# Patient Record
Sex: MALE | Race: WHITE | NOT HISPANIC OR LATINO | Employment: OTHER | URBAN - METROPOLITAN AREA
[De-identification: names, ages, dates, MRNs, and addresses within clinical notes are randomized per-mention and may not be internally consistent; named-entity substitution may affect disease eponyms.]

---

## 2017-01-09 ENCOUNTER — LAB VISIT (OUTPATIENT)
Dept: LAB | Facility: HOSPITAL | Age: 62
End: 2017-01-09
Attending: FAMILY MEDICINE
Payer: COMMERCIAL

## 2017-01-09 DIAGNOSIS — E11.69 DYSLIPIDEMIA ASSOCIATED WITH TYPE 2 DIABETES MELLITUS: ICD-10-CM

## 2017-01-09 DIAGNOSIS — E11.22 HYPERTENSION ASSOCIATED WITH CHRONIC KIDNEY DISEASE DUE TO TYPE 2 DIABETES MELLITUS: ICD-10-CM

## 2017-01-09 DIAGNOSIS — I12.9 HYPERTENSION ASSOCIATED WITH CHRONIC KIDNEY DISEASE DUE TO TYPE 2 DIABETES MELLITUS: ICD-10-CM

## 2017-01-09 DIAGNOSIS — E78.5 DYSLIPIDEMIA ASSOCIATED WITH TYPE 2 DIABETES MELLITUS: ICD-10-CM

## 2017-01-09 LAB
ALBUMIN SERPL BCP-MCNC: 3.9 G/DL
ALP SERPL-CCNC: 56 U/L
ALT SERPL W/O P-5'-P-CCNC: 27 U/L
ANION GAP SERPL CALC-SCNC: 5 MMOL/L
AST SERPL-CCNC: 21 U/L
BILIRUB SERPL-MCNC: 0.4 MG/DL
BUN SERPL-MCNC: 19 MG/DL
CALCIUM SERPL-MCNC: 9.2 MG/DL
CHLORIDE SERPL-SCNC: 104 MMOL/L
CHOLEST/HDLC SERPL: 6.6 {RATIO}
CO2 SERPL-SCNC: 28 MMOL/L
CREAT SERPL-MCNC: 1.2 MG/DL
EST. GFR  (AFRICAN AMERICAN): >60 ML/MIN/1.73 M^2
EST. GFR  (NON AFRICAN AMERICAN): >60 ML/MIN/1.73 M^2
GLUCOSE SERPL-MCNC: 105 MG/DL
HDL/CHOLESTEROL RATIO: 15.1 %
HDLC SERPL-MCNC: 265 MG/DL
HDLC SERPL-MCNC: 40 MG/DL
LDLC SERPL CALC-MCNC: 202.4 MG/DL
NONHDLC SERPL-MCNC: 225 MG/DL
POTASSIUM SERPL-SCNC: 4.8 MMOL/L
PROT SERPL-MCNC: 6.8 G/DL
SODIUM SERPL-SCNC: 137 MMOL/L
TRIGL SERPL-MCNC: 113 MG/DL

## 2017-01-09 PROCEDURE — 80053 COMPREHEN METABOLIC PANEL: CPT

## 2017-01-09 PROCEDURE — 83036 HEMOGLOBIN GLYCOSYLATED A1C: CPT

## 2017-01-09 PROCEDURE — 36415 COLL VENOUS BLD VENIPUNCTURE: CPT | Mod: PO

## 2017-01-09 PROCEDURE — 80061 LIPID PANEL: CPT

## 2017-01-10 LAB
ESTIMATED AVG GLUCOSE: 137 MG/DL
HBA1C MFR BLD HPLC: 6.4 %

## 2017-01-30 ENCOUNTER — OFFICE VISIT (OUTPATIENT)
Dept: INTERNAL MEDICINE | Facility: CLINIC | Age: 62
End: 2017-01-30
Payer: COMMERCIAL

## 2017-01-30 VITALS
BODY MASS INDEX: 41.06 KG/M2 | SYSTOLIC BLOOD PRESSURE: 120 MMHG | TEMPERATURE: 98 F | WEIGHT: 303.13 LBS | HEART RATE: 66 BPM | HEIGHT: 72 IN | DIASTOLIC BLOOD PRESSURE: 88 MMHG

## 2017-01-30 DIAGNOSIS — E78.5 DYSLIPIDEMIA ASSOCIATED WITH TYPE 2 DIABETES MELLITUS: ICD-10-CM

## 2017-01-30 DIAGNOSIS — E11.22 HYPERTENSION ASSOCIATED WITH CHRONIC KIDNEY DISEASE DUE TO TYPE 2 DIABETES MELLITUS: ICD-10-CM

## 2017-01-30 DIAGNOSIS — E66.01 MORBID OBESITY DUE TO EXCESS CALORIES: ICD-10-CM

## 2017-01-30 DIAGNOSIS — E11.69 DYSLIPIDEMIA ASSOCIATED WITH TYPE 2 DIABETES MELLITUS: ICD-10-CM

## 2017-01-30 DIAGNOSIS — I12.9 HYPERTENSION ASSOCIATED WITH CHRONIC KIDNEY DISEASE DUE TO TYPE 2 DIABETES MELLITUS: ICD-10-CM

## 2017-01-30 PROCEDURE — 3044F HG A1C LEVEL LT 7.0%: CPT | Mod: S$GLB,,, | Performed by: FAMILY MEDICINE

## 2017-01-30 PROCEDURE — 99999 PR PBB SHADOW E&M-EST. PATIENT-LVL III: CPT | Mod: PBBFAC,,, | Performed by: FAMILY MEDICINE

## 2017-01-30 PROCEDURE — 2022F DILAT RTA XM EVC RTNOPTHY: CPT | Mod: S$GLB,,, | Performed by: FAMILY MEDICINE

## 2017-01-30 PROCEDURE — 1159F MED LIST DOCD IN RCRD: CPT | Mod: S$GLB,,, | Performed by: FAMILY MEDICINE

## 2017-01-30 PROCEDURE — 4010F ACE/ARB THERAPY RXD/TAKEN: CPT | Mod: S$GLB,,, | Performed by: FAMILY MEDICINE

## 2017-01-30 PROCEDURE — 99214 OFFICE O/P EST MOD 30 MIN: CPT | Mod: S$GLB,,, | Performed by: FAMILY MEDICINE

## 2017-01-30 NOTE — MR AVS SNAPSHOT
[facility]  [address] [place] [address]  Phone: [phone]  Fax: [phone]                  Aniket Brown   2017 11:00 AM   Office Visit    Description:  Male : 1955   Provider:  Garry Chandler MD   [facility]           Reason for Visit     Follow-up           Diagnoses this Visit        Comments    Uncontrolled type 2 diabetes mellitus with microalbuminuria, without long-term current use of insulin    -  Primary     Dyslipidemia associated with type 2 diabetes mellitus         Hypertension associated with chronic kidney disease due to type 2 diabetes mellitus                To Do List           Future Appointments        Provider Department Dept Phone    3/27/2017 10:00 AM LABORATORY, [address] [facility] [facility] [phone]    3/29/2017 1:40 PM MD [facility]  Urology [phone]    2017 7:50 AM LABORATORY, [facility] [phone]    2017 11:00 AM Garry Chandler MD [facility] [phone]      Goals (5 Years of Data)     None      Follow-Up and Disposition     Return in about 6 months (around 2017).      [facility]     [facility] -  Assistance  Registered nurses in the [facility] provide clinical advisement, health education, appointment booking, and other advisory services.  Call for this free service at [phone].             Medications           Message regarding Medications     Verify the changes and/or additions to your medication regime listed below are the same as discussed with your clinician today.  If any of these changes or additions are incorrect, please notify your healthcare provider.             Verify that the below list of medications is an accurate representation of the medications you are currently taking.  If none reported, the list may be blank. If  "incorrect, please contact your healthcare provider. Carry this list with you in case of emergency.           Current Medications     glimepiride (AMARYL) 2 MG tablet Take 1 tablet (2 mg total) by mouth before breakfast.    losartan (COZAAR) 25 MG tablet Take 1 tablet (25 mg total) by mouth once daily.    meloxicam (MOBIC) 7.5 MG tablet TAKE 1 TABLET DAILY EVERY MORNING AS NEEDED FOR PAIN    pravastatin (PRAVACHOL) 20 MG tablet Take 1 tablet (20 mg total) by mouth once daily.    testosterone (ANDROGEL) 20.25 mg/1.25 gram (1.62 %) GlPm 4 Squirts by Percutaneous route once daily. 4 Gel in Metered-Dose Pump Transdermal Every day    triamcinolone acetonide 0.1% (KENALOG) 0.1 % ointment Apply topically 2 (two) times daily.           Clinical Reference Information           Vital Signs - Last Recorded  Most recent update: 1/30/2017 11:21 AM by Nia Fregoso MA    BP Pulse Temp Ht Wt BMI    120/88 66 98.3 °F (36.8 °C) (Oral) 5' 11.75" (1.822 m) (!) 137.5 kg (303 lb 2.1 oz) 41.4 kg/m2      Blood Pressure          Most Recent Value    BP  120/88      Allergies as of 1/30/2017     Crestor  [Rosuvastatin]    Lipitor  [Atorvastatin]    Metformin      Immunizations Administered on Date of Encounter - 1/30/2017     None      Orders Placed During Today's Visit     Future Labs/Procedures Expected by Expires    Comprehensive metabolic panel  7/30/2017 10/30/2017    Hemoglobin A1c  7/30/2017 10/30/2017    Lipid panel  7/30/2017 10/30/2017      [facility] Sign-Up     Activating your [facility] account is as easy as 1-2-3!     1) Visit [email or web address], select Sign Up Now, enter this activation code and your date of birth, then select Next.  [email or web address]  Expires: 3/16/2017 11:54 AM      2) Create a username and password to use when you visit [facility] in the future and select a security question in case you lose your password and select Next.    3) Enter your e-mail address and click Sign Up!    Additional Information  If you " have questions, please e-mail [email or web address] or call [phone] to talk to our [facility] staff. Remember, [facility] is NOT to be used for urgent needs. For medical emergencies, dial 911.

## 2017-01-31 NOTE — PROGRESS NOTES
"Subjective:      Patient ID: Aniket Brown is a 61 y.o. male.    Chief Complaint:  F/U BP/DM/Lipids/weight    HPI  62 yo male here for f/u on chronic conditions.  Not been taking statin.  Taking BP and DM med.  Has had bad SE to statin so leary to take it.  Feeling good overall.  Weight is same, planning to focus on some changes to work on weight loss.  Denies CP/SOB.  Bowels moving well.    Past Medical History   Diagnosis Date    Diabetes 1.5, managed as type 2     Hyperlipidemia      Family History   Problem Relation Age of Onset    Early death Father     Diabetes Brother     Drug abuse Brother     Alcohol abuse Brother      Past Surgical History   Procedure Laterality Date    Appendectomy      Colonoscopy N/A 11/17/2016     Procedure: COLONOSCOPY;  Surgeon: Miguel Lance MD;  Location: [facility];  Service: Endoscopy;  Laterality: N/A;     Social History   Substance Use Topics    Smoking status: Never Smoker    Smokeless tobacco: Never Used    Alcohol use 0.0 oz/week     0 Standard drinks or equivalent per week      Comment: socially       Visit Vitals    /88    Pulse 66    Temp 98.3 °F (36.8 °C) (Oral)    Ht 5' 11.75" (1.822 m)    Wt (!) 137.5 kg (303 lb 2.1 oz)    BMI 41.4 kg/m2       Review of Systems   Constitutional: Negative for activity change, appetite change, chills, diaphoresis, fatigue, fever and unexpected weight change.   HENT: Negative for hearing loss and tinnitus.    Eyes: Negative for visual disturbance.   Respiratory: Negative for cough, chest tightness, shortness of breath and wheezing.    Cardiovascular: Negative for chest pain, palpitations and leg swelling.   Gastrointestinal: Negative for abdominal distention and abdominal pain.   Genitourinary: Negative for difficulty urinating.   Musculoskeletal: Negative for arthralgias and back pain.   Skin: Negative.    Neurological: Negative for dizziness, weakness and headaches.     Objective:     Physical Exam "   Constitutional: He is oriented to person, place, and time. He appears well-developed and well-nourished. No distress.   HENT:   Right Ear: External ear normal.   Left Ear: External ear normal.   Nose: Nose normal.   Mouth/Throat: Oropharynx is clear and moist.   Eyes: Pupils are equal, round, and reactive to light.   Neck: Normal range of motion. Neck supple. No thyromegaly present.   Cardiovascular: Normal rate, regular rhythm and normal heart sounds.    No murmur heard.  Pulmonary/Chest: Effort normal and breath sounds normal. No respiratory distress. He has no wheezes.   Abdominal: Soft. Bowel sounds are normal. He exhibits no distension.   Musculoskeletal: He exhibits no edema.   Lymphadenopathy:     He has no cervical adenopathy.   Neurological: He is alert and oriented to person, place, and time. No cranial nerve deficit.   Skin: Skin is warm and dry. He is not diaphoretic.   Nursing note and vitals reviewed.      Lab Results   Component Value Date    WBC 6.93 09/26/2016    HGB 15.8 09/26/2016    HCT 46.0 09/26/2016     09/26/2016    CHOL 265 (H) 01/09/2017    TRIG 113 01/09/2017    HDL 40 01/09/2017    ALT 27 01/09/2017    AST 21 01/09/2017     01/09/2017    K 4.8 01/09/2017     01/09/2017    CREATININE 1.2 01/09/2017    BUN 19 01/09/2017    CO2 28 01/09/2017    TSH 2.898 09/26/2016    PSA 2.2 03/07/2016    HGBA1C 6.4 (H) 01/09/2017       Assessment:     1. Uncontrolled type 2 diabetes mellitus with microalbuminuria, without long-term current use of insulin    2. Dyslipidemia associated with type 2 diabetes mellitus    3. Hypertension associated with chronic kidney disease due to type 2 diabetes mellitus    4. Morbid obesity due to excess calories       Plan:   Uncontrolled type 2 diabetes mellitus with microalbuminuria, without long-term current use of insulin  -     Comprehensive metabolic panel; Future; Expected date: 7/30/17  -     Hemoglobin A1c; Future; Expected date:  7/30/17    Dyslipidemia associated with type 2 diabetes mellitus  -     Lipid panel; Future; Expected date: 7/30/17    Hypertension associated with chronic kidney disease due to type 2 diabetes mellitus    Morbid obesity due to excess calories    BP well controlled, cont ARB  DM controlled cont current med  Lipids uncontrolled.  Try the statin daily.  Work on good diet/exercise to achieve weight loss.  Declines pneumonia vaccine  F/u 6 mos with labs

## 2017-03-27 ENCOUNTER — LAB VISIT (OUTPATIENT)
Dept: LAB | Facility: HOSPITAL | Age: 62
End: 2017-03-27
Attending: UROLOGY
Payer: COMMERCIAL

## 2017-03-27 DIAGNOSIS — E23.0 HYPOGONADOTROPIC HYPOGONADISM: Chronic | ICD-10-CM

## 2017-03-27 LAB
ALBUMIN SERPL BCP-MCNC: 3.7 G/DL
ALP SERPL-CCNC: 52 U/L
ALT SERPL W/O P-5'-P-CCNC: 28 U/L
AST SERPL-CCNC: 20 U/L
BILIRUB DIRECT SERPL-MCNC: 0.1 MG/DL
BILIRUB SERPL-MCNC: 0.3 MG/DL
HCT VFR BLD AUTO: 45.3 %
PROT SERPL-MCNC: 6.7 G/DL

## 2017-03-27 PROCEDURE — 85014 HEMATOCRIT: CPT

## 2017-03-27 PROCEDURE — 36415 COLL VENOUS BLD VENIPUNCTURE: CPT | Mod: PO

## 2017-03-27 PROCEDURE — 80076 HEPATIC FUNCTION PANEL: CPT

## 2017-03-29 ENCOUNTER — OFFICE VISIT (OUTPATIENT)
Dept: UROLOGY | Facility: CLINIC | Age: 62
End: 2017-03-29
Payer: COMMERCIAL

## 2017-03-29 VITALS
HEART RATE: 68 BPM | WEIGHT: 302 LBS | BODY MASS INDEX: 41.25 KG/M2 | DIASTOLIC BLOOD PRESSURE: 92 MMHG | SYSTOLIC BLOOD PRESSURE: 140 MMHG

## 2017-03-29 DIAGNOSIS — E23.0 HYPOGONADOTROPIC HYPOGONADISM: Chronic | ICD-10-CM

## 2017-03-29 DIAGNOSIS — E29.1 HYPOGONADISM IN MALE: Primary | ICD-10-CM

## 2017-03-29 PROCEDURE — 99214 OFFICE O/P EST MOD 30 MIN: CPT | Mod: S$GLB,,, | Performed by: UROLOGY

## 2017-03-29 PROCEDURE — 1160F RVW MEDS BY RX/DR IN RCRD: CPT | Mod: S$GLB,,, | Performed by: UROLOGY

## 2017-03-29 PROCEDURE — 99999 PR PBB SHADOW E&M-EST. PATIENT-LVL II: CPT | Mod: PBBFAC,,, | Performed by: UROLOGY

## 2017-03-29 RX ORDER — TESTOSTERONE 20.25 MG/1.25G
4 GEL TOPICAL DAILY
Qty: 6 BOTTLE | Refills: 5 | Status: SHIPPED | OUTPATIENT
Start: 2017-03-29 | End: 2017-10-09 | Stop reason: SDUPTHER

## 2017-03-29 NOTE — MR AVS SNAPSHOT
[facility]  [address]  [place] [address]  Phone: [phone]  Fax: [phone]                  Aniket Brown   3/29/2017 1:40 PM   Office Visit    Description:  Male : 1955   Provider:  Fran Moise IV, MD   Department:  [facility]           Diagnoses this Visit        Comments    Hypogonadism in male    -  Primary     Hypogonadotropic hypogonadism                To Do List           Future Appointments        Provider Department Dept Phone    2017 7:50 AM LAB[facility]ATORY, [facility] [phone]    2017 11:00 AM Garry Chandler MD [place] [phone]    2017 10:00 AM LABORATORY, [facility] [phone]    10/9/2017 11:00 AM Fran Moise IV, MD [facility] [phone]      Goals (5 Years of Data)     None      Follow-Up and Disposition     Return in about 6 months (around 2017).    Follow-up and Disposition History       These Medications        Disp Refills Start End    testosterone (ANDROGEL) 20.25 mg/1.25 gram (1.62 %) GlPm 6 Bottle 5 3/29/2017     4 Squirts by Percutaneous route once daily. 4 Gel in Metered-Dose Pump Transdermal Every day - Percutaneous    Pharmacy: [facility] - [address] #: [phone]         [facility]     [facility] -  Assistance  Registered nurses in the [facility] provide clinical advisement, health education, appointment booking, and other advisory services.  Call for this free service at [phone].             Medications           Message regarding Medications     Verify the changes and/or additions to your medication regime listed below are the same as discussed with your clinician today.  If any of these changes or additions are incorrect, please notify your healthcare provider.        STOP taking these medications      triamcinolone acetonide 0.1% (KENALOG) 0.1 % ointment Apply topically 2 (two) times daily.           Verify that the below list of medications is an accurate representation of the medications you are currently taking.  If none reported, the list may be blank. If incorrect, please contact your healthcare provider. Carry this list with you in case of emergency.           Current Medications     glimepiride (AMARYL) 2 MG tablet Take 1 tablet (2 mg total) by mouth before breakfast.    losartan (COZAAR) 25 MG tablet Take 1 tablet (25 mg total) by mouth once daily.    meloxicam (MOBIC) 7.5 MG tablet TAKE 1 TABLET DAILY EVERY MORNING AS NEEDED FOR PAIN    pravastatin (PRAVACHOL) 20 MG tablet Take 1 tablet (20 mg total) by mouth once daily.    testosterone (ANDROGEL) 20.25 mg/1.25 gram (1.62 %) GlPm 4 Squirts by Percutaneous route once daily. 4 Gel in Metered-Dose Pump Transdermal Every day           Clinical Reference Information           Your Vitals Were     BP Pulse Weight BMI       140/92 (BP Location: Right arm, Patient Position: Sitting) 68 137 kg (302 lb 0.5 oz) 41.25 kg/m2       Blood Pressure          Most Recent Value    BP  (!)  140/92      Allergies as of 3/29/2017     Crestor  [Rosuvastatin]    Lipitor  [Atorvastatin]    Metformin      Immunizations Administered on Date of Encounter - 3/29/2017     None      Orders Placed During Today's Visit     Future Labs/Procedures Expected by Expires    ESTRADIOL  3/29/2017 5/28/2018    Hematocrit  3/29/2017 5/28/2018    Hepatic function panel  3/29/2017 5/28/2018    TESTOSTERONE PANEL  3/29/2017 5/28/2018    PSA, Screening  3/29/2018 5/28/2018      [facility] Sign-Up     Activating your [facility] account is as easy as 1-2-3!     1) Visit [email or web address], select Sign Up Now, enter this activation code and your date of birth, then select Next.  [email or web address]  Expires: 5/13/2017  2:09 PM      2) Create a username and password to use when you visit [facility] in the future  and select a security question in case you lose your password and select Next.    3) Enter your e-mail address and click Sign Up!    Additional Information  If you have questions, please e-mail [email or web address] or call [phone] to talk to our [facility] staff. Remember, [facility] is NOT to be used for urgent needs. For medical emergencies, dial 911.         Language Assistance Services     ATTENTION: Language assistance services are available, free of charge. Please call [phone].      ATENCIÓN: Si habla español, tiene a waller disposición servicios gratuitos de asistencia lingüística. Llame al [phone].     CHÚ Ý: N?u b?n nói Ti?ng Vi?t, có các d?ch v? h? tr? ngôn ng? mi?n phí dành cho b?n. G?i s? [phone].         [facility] complies with applicable Federal civil rights laws and does not discriminate on the basis of race, color, national origin, age, disability, or sex.

## 2017-03-29 NOTE — PROGRESS NOTES
Chief Complaint: Hypogonadism    HPI:   3/29/17: No problems feeling well.  Been off the T a month starting to feel it.  9/29/16: Doing well labs appropriate.  Misses the T since he has been off a few weeks.  3/28/16: No problems doing well.  9/25/15: No problems with T satisfied, labs approp.  3/27/15: 58 yo man 7 yrs ago was worked up and diagnosed for hypogonadism (low energy, nausea, etc) and was started on depot T then gel now lately Androgel (1.63%) daily.  Recent LFT/hematocrit normal.  No urolithiasis history, gross hematuria, family prostate cancer history.  No abd/pelvic pain and no exac/rel factors.  Has been off T since November and the symptoms are back fully.  T is low on lab recheck.  Has had microhematuria for many years with cysto twice and full workup negative.    Allergies:  Crestor  [rosuvastatin]; Lipitor  [atorvastatin]; and Metformin    Medications:  has a current medication list which includes the following prescription(s): glimepiride, losartan, meloxicam, pravastatin, and testosterone.    Review of Systems:  General: No fever, chills, fatigability, or weight loss.  Skin: No rashes, itching, or changes in color or texture of skin.  Chest: Denies LEVY, cyanosis, wheezing, cough, and sputum production.  Abdomen: Appetite fine. No weight loss. Denies diarrhea, abdominal pain, hematemesis, or blood in stool.  Musculoskeletal: No joint stiffness or swelling. Denies back pain.  : As above.  All other review of systems negative.    PMH:   has a past medical history of Diabetes 1.5, managed as type 2 and Hyperlipidemia.    PSH:   has a past surgical history that includes Appendectomy and Colonoscopy (N/A, 11/17/2016).    FamHx: family history includes Alcohol abuse in his brother; Diabetes in his brother; Drug abuse in his brother; Early death in his father.    SocHx:  reports that he has never smoked. He has never used smokeless tobacco. He reports that he drinks alcohol. He reports that he does  not use illicit drugs.      Physical Exam:  Vitals:    03/29/17 1400   BP: (!) 140/92   Pulse: 68     General: A&Ox3, no apparent distress, no deformities  Neck: No masses, normal thyroid  Lungs: normal inspiration, no use of accessory muscles  Heart: normal pulse, no arrhythmias  Abdomen: Soft, NT, ND  Skin: The skin is warm and dry. No jaundice.  Ext: No c/c/e.  :   3/28/16: Test desc willa, no abnormalities of epididymus. Penis normal, with normal penile and scrotal skin. Meatus normal. Normal rectal tone, no hemorrhoids. Prost 30 gm no nodules or masses appreciated. SV not palpable. Perineum and anus normal.    Labs/Studies:   PSA    3/15: 1.6    3/16: 2.2    Impression/Plan:   1. Will rx androgel and check labs q6 mo with a visit each interval; RTC with me if things change.

## 2017-04-17 DIAGNOSIS — R52 PAIN: ICD-10-CM

## 2017-04-17 RX ORDER — MELOXICAM 7.5 MG/1
TABLET ORAL
Qty: 90 TABLET | Refills: 2 | Status: SHIPPED | OUTPATIENT
Start: 2017-04-17 | End: 2018-01-15 | Stop reason: SDUPTHER

## 2017-05-12 ENCOUNTER — TELEPHONE (OUTPATIENT)
Dept: UROLOGY | Facility: CLINIC | Age: 62
End: 2017-05-12

## 2017-05-12 NOTE — TELEPHONE ENCOUNTER
Patient states he is going on a cruise soon and would like a prescription for motion sickness. He states you had done this for him one time before. Please advise.

## 2017-05-12 NOTE — TELEPHONE ENCOUNTER
----- Message from Diego Peters sent at 5/12/2017  8:27 AM CDT -----  Pt is requesting a prescription for motion sickness.              Please call pt back at [phone]          [facility]/pharmacy #[id number] - [place] - [address]  [address]  [place] [address]  Phone: [phone] Fax: [phone]

## 2017-05-15 RX ORDER — MECLIZINE HCL 12.5 MG 12.5 MG/1
12.5 TABLET ORAL 3 TIMES DAILY PRN
Qty: 30 TABLET | Refills: 1 | Status: SHIPPED | OUTPATIENT
Start: 2017-05-15 | End: 2017-07-31

## 2017-07-24 ENCOUNTER — LAB VISIT (OUTPATIENT)
Dept: LAB | Facility: HOSPITAL | Age: 62
End: 2017-07-24
Attending: FAMILY MEDICINE
Payer: COMMERCIAL

## 2017-07-24 DIAGNOSIS — E11.69 DYSLIPIDEMIA ASSOCIATED WITH TYPE 2 DIABETES MELLITUS: ICD-10-CM

## 2017-07-24 DIAGNOSIS — E78.5 DYSLIPIDEMIA ASSOCIATED WITH TYPE 2 DIABETES MELLITUS: ICD-10-CM

## 2017-07-24 LAB
ALBUMIN SERPL BCP-MCNC: 3.7 G/DL
ALP SERPL-CCNC: 50 U/L
ALT SERPL W/O P-5'-P-CCNC: 37 U/L
ANION GAP SERPL CALC-SCNC: 9 MMOL/L
AST SERPL-CCNC: 28 U/L
BILIRUB SERPL-MCNC: 0.5 MG/DL
BUN SERPL-MCNC: 19 MG/DL
CALCIUM SERPL-MCNC: 8.8 MG/DL
CHLORIDE SERPL-SCNC: 107 MMOL/L
CHOLEST/HDLC SERPL: 7.5 {RATIO}
CO2 SERPL-SCNC: 23 MMOL/L
CREAT SERPL-MCNC: 1.2 MG/DL
EST. GFR  (AFRICAN AMERICAN): >60 ML/MIN/1.73 M^2
EST. GFR  (NON AFRICAN AMERICAN): >60 ML/MIN/1.73 M^2
GLUCOSE SERPL-MCNC: 150 MG/DL
HDL/CHOLESTEROL RATIO: 13.3 %
HDLC SERPL-MCNC: 249 MG/DL
HDLC SERPL-MCNC: 33 MG/DL
LDLC SERPL CALC-MCNC: 172.6 MG/DL
NONHDLC SERPL-MCNC: 216 MG/DL
POTASSIUM SERPL-SCNC: 4.5 MMOL/L
PROT SERPL-MCNC: 6.4 G/DL
SODIUM SERPL-SCNC: 139 MMOL/L
TRIGL SERPL-MCNC: 217 MG/DL

## 2017-07-24 PROCEDURE — 80053 COMPREHEN METABOLIC PANEL: CPT

## 2017-07-24 PROCEDURE — 36415 COLL VENOUS BLD VENIPUNCTURE: CPT | Mod: PO

## 2017-07-24 PROCEDURE — 83036 HEMOGLOBIN GLYCOSYLATED A1C: CPT

## 2017-07-24 PROCEDURE — 80061 LIPID PANEL: CPT

## 2017-07-25 LAB
ESTIMATED AVG GLUCOSE: 157 MG/DL
HBA1C MFR BLD HPLC: 7.1 %

## 2017-07-31 ENCOUNTER — OFFICE VISIT (OUTPATIENT)
Dept: INTERNAL MEDICINE | Facility: CLINIC | Age: 62
End: 2017-07-31
Payer: COMMERCIAL

## 2017-07-31 VITALS
TEMPERATURE: 98 F | HEART RATE: 70 BPM | WEIGHT: 306.88 LBS | BODY MASS INDEX: 41.57 KG/M2 | HEIGHT: 72 IN | SYSTOLIC BLOOD PRESSURE: 122 MMHG | DIASTOLIC BLOOD PRESSURE: 86 MMHG

## 2017-07-31 DIAGNOSIS — E11.9 CONTROLLED TYPE 2 DIABETES MELLITUS WITHOUT COMPLICATION, WITHOUT LONG-TERM CURRENT USE OF INSULIN: ICD-10-CM

## 2017-07-31 DIAGNOSIS — E11.69 DYSLIPIDEMIA ASSOCIATED WITH TYPE 2 DIABETES MELLITUS: ICD-10-CM

## 2017-07-31 DIAGNOSIS — Z00.00 ROUTINE PHYSICAL EXAMINATION: Primary | ICD-10-CM

## 2017-07-31 DIAGNOSIS — E78.5 DYSLIPIDEMIA ASSOCIATED WITH TYPE 2 DIABETES MELLITUS: ICD-10-CM

## 2017-07-31 PROCEDURE — 99999 PR PBB SHADOW E&M-EST. PATIENT-LVL III: CPT | Mod: PBBFAC,,, | Performed by: FAMILY MEDICINE

## 2017-07-31 PROCEDURE — 99396 PREV VISIT EST AGE 40-64: CPT | Mod: S$GLB,,, | Performed by: FAMILY MEDICINE

## 2017-08-01 NOTE — PROGRESS NOTES
"Subjective:      Patient ID: Aniket Brown is a 61 y.o. male.    Chief Complaint: Follow-up (6 mo / labs)    HPI  60 yo male here for annual review.  Not compliant with meds, maybe takes 3/7 days in a week.  Not eating right, not exercising.  Denies CP/SOB.  Declines pneumonia vaccine    Past Medical History:   Diagnosis Date    Diabetes 1.5, managed as type 2     Hyperlipidemia      Family History   Problem Relation Age of Onset    Early death Father     Diabetes Brother     Drug abuse Brother     Alcohol abuse Brother      Past Surgical History:   Procedure Laterality Date    APPENDECTOMY      COLONOSCOPY N/A 11/17/2016    Procedure: COLONOSCOPY;  Surgeon: Miguel Lance MD;  Location: [facility];  Service: Endoscopy;  Laterality: N/A;     Social History   Substance Use Topics    Smoking status: Never Smoker    Smokeless tobacco: Never Used    Alcohol use 0.0 oz/week      Comment: socially       /86   Pulse 70   Temp 98 °F (36.7 °C) (Tympanic)   Ht 5' 11.75" (1.822 m)   Wt (!) 139.2 kg (306 lb 14.1 oz)   BMI 41.91 kg/m²     Review of Systems   Constitutional: Negative for activity change, appetite change, chills, diaphoresis, fatigue, fever and unexpected weight change.   HENT: Negative for hearing loss and tinnitus.    Eyes: Negative for visual disturbance.   Respiratory: Negative for cough, chest tightness, shortness of breath and wheezing.    Cardiovascular: Negative for chest pain, palpitations and leg swelling.   Gastrointestinal: Negative for abdominal distention, abdominal pain, constipation and diarrhea.   Endocrine: Negative.    Genitourinary: Negative for difficulty urinating.   Musculoskeletal: Negative for arthralgias and back pain.   Skin: Negative.    Neurological: Negative for dizziness, weakness and headaches.   Psychiatric/Behavioral: Negative.      Objective:     Physical Exam   Constitutional: He is oriented to person, place, and time. He appears well-developed and " well-nourished. No distress.   HENT:   Right Ear: External ear normal.   Left Ear: External ear normal.   Nose: Nose normal.   Mouth/Throat: Oropharynx is clear and moist.   Eyes: Pupils are equal, round, and reactive to light.   Neck: Normal range of motion. Neck supple. No thyromegaly present.   Cardiovascular: Normal rate, regular rhythm and normal heart sounds.    No murmur heard.  Pulmonary/Chest: Effort normal and breath sounds normal. No respiratory distress. He has no wheezes.   Abdominal: Soft. Bowel sounds are normal. He exhibits no distension. There is no tenderness. There is no guarding.   Musculoskeletal: He exhibits no edema.   Neurological: He is alert and oriented to person, place, and time. No cranial nerve deficit.   Skin: Skin is warm and dry. He is not diaphoretic.   Psychiatric: He has a normal mood and affect. His behavior is normal. Judgment and thought content normal.   Nursing note and vitals reviewed.      Lab Results   Component Value Date    WBC 6.93 09/26/2016    HGB 15.8 09/26/2016    HCT 45.3 03/27/2017     09/26/2016    CHOL 249 (H) 07/24/2017    TRIG 217 (H) 07/24/2017    HDL 33 (L) 07/24/2017    ALT 37 07/24/2017    AST 28 07/24/2017     07/24/2017    K 4.5 07/24/2017     07/24/2017    CREATININE 1.2 07/24/2017    BUN 19 07/24/2017    CO2 23 07/24/2017    TSH 2.898 09/26/2016    PSA 2.2 03/07/2016    HGBA1C 7.1 (H) 07/24/2017       Assessment:     1. Routine physical examination    2. Controlled type 2 diabetes mellitus without complication, without long-term current use of insulin    3. Dyslipidemia associated with type 2 diabetes mellitus       Plan:   Routine physical examination    Controlled type 2 diabetes mellitus without complication, without long-term current use of insulin  -     Hemoglobin A1c; Future; Expected date: 01/31/2018    Dyslipidemia associated with type 2 diabetes mellitus  -     Lipid panel; Future; Expected date: 01/31/2018  -      Comprehensive metabolic panel; Future; Expected date: 01/31/2018    BP stable, take Losartan daily.  DM not at goal.  Take 2mg of amaryl daily as prescribed.  Lipids not at goal, take statin daily  Weight not controlled, discussion about losing weight in order to not have to take any meds.  F/U 6 mos  Declines pneumonia vaccine

## 2017-09-29 ENCOUNTER — LAB VISIT (OUTPATIENT)
Dept: LAB | Facility: HOSPITAL | Age: 62
End: 2017-09-29
Attending: UROLOGY
Payer: COMMERCIAL

## 2017-09-29 DIAGNOSIS — E29.1 HYPOGONADISM IN MALE: ICD-10-CM

## 2017-09-29 DIAGNOSIS — E23.0 HYPOGONADOTROPIC HYPOGONADISM: Chronic | ICD-10-CM

## 2017-09-29 LAB
ALBUMIN SERPL BCP-MCNC: 3.5 G/DL
ALP SERPL-CCNC: 57 U/L
ALT SERPL W/O P-5'-P-CCNC: 43 U/L
AST SERPL-CCNC: 31 U/L
BILIRUB DIRECT SERPL-MCNC: 0.1 MG/DL
BILIRUB SERPL-MCNC: 0.4 MG/DL
COMPLEXED PSA SERPL-MCNC: 2 NG/ML
ESTRADIOL SERPL-MCNC: 53 PG/ML
HCT VFR BLD AUTO: 44.5 %
PROT SERPL-MCNC: 6.5 G/DL

## 2017-09-29 PROCEDURE — 36415 COLL VENOUS BLD VENIPUNCTURE: CPT | Mod: PO

## 2017-09-29 PROCEDURE — 80076 HEPATIC FUNCTION PANEL: CPT

## 2017-09-29 PROCEDURE — 85014 HEMATOCRIT: CPT

## 2017-09-29 PROCEDURE — 84153 ASSAY OF PSA TOTAL: CPT

## 2017-09-29 PROCEDURE — 82670 ASSAY OF TOTAL ESTRADIOL: CPT

## 2017-09-29 PROCEDURE — 84270 ASSAY OF SEX HORMONE GLOBUL: CPT

## 2017-10-03 LAB
ALBUMIN SERPL-MCNC: 4.1 G/DL (ref 3.6–5.1)
SHBG SERPL-SCNC: 14 NMOL/L (ref 22–77)
TESTOST FREE SERPL-MCNC: 104.3 PG/ML (ref 46–224)
TESTOST SERPL-MCNC: 411 NG/DL (ref 250–1100)
TESTOSTERONE.FREE+WB SERPL-MCNC: 196.3 NG/DL (ref 110–575)

## 2017-10-09 ENCOUNTER — OFFICE VISIT (OUTPATIENT)
Dept: UROLOGY | Facility: CLINIC | Age: 62
End: 2017-10-09
Payer: COMMERCIAL

## 2017-10-09 VITALS
WEIGHT: 304.69 LBS | BODY MASS INDEX: 41.61 KG/M2 | SYSTOLIC BLOOD PRESSURE: 149 MMHG | DIASTOLIC BLOOD PRESSURE: 88 MMHG | HEART RATE: 80 BPM

## 2017-10-09 DIAGNOSIS — E23.0 HYPOGONADOTROPIC HYPOGONADISM: Chronic | ICD-10-CM

## 2017-10-09 PROCEDURE — 99999 PR PBB SHADOW E&M-EST. PATIENT-LVL II: CPT | Mod: PBBFAC,,, | Performed by: UROLOGY

## 2017-10-09 PROCEDURE — 99214 OFFICE O/P EST MOD 30 MIN: CPT | Mod: S$GLB,,, | Performed by: UROLOGY

## 2017-10-09 RX ORDER — TESTOSTERONE 20.25 MG/1.25G
3 GEL TOPICAL DAILY
Qty: 6 BOTTLE | Refills: 5 | Status: SHIPPED | OUTPATIENT
Start: 2017-10-09 | End: 2018-04-16 | Stop reason: SDUPTHER

## 2017-10-09 RX ORDER — TESTOSTERONE 20.25 MG/1.25G
3 GEL TOPICAL DAILY
Qty: 6 BOTTLE | Refills: 5 | Status: SHIPPED | OUTPATIENT
Start: 2017-10-09 | End: 2017-10-09 | Stop reason: SDUPTHER

## 2017-10-16 ENCOUNTER — TELEPHONE (OUTPATIENT)
Dept: UROLOGY | Facility: CLINIC | Age: 62
End: 2017-10-16

## 2017-10-16 RX ORDER — GLIMEPIRIDE 2 MG/1
TABLET ORAL
Qty: 90 TABLET | Refills: 3 | Status: SHIPPED | OUTPATIENT
Start: 2017-10-16 | End: 2018-10-11 | Stop reason: SDUPTHER

## 2017-10-16 RX ORDER — LOSARTAN POTASSIUM 25 MG/1
TABLET ORAL
Qty: 90 TABLET | Refills: 3 | Status: SHIPPED | OUTPATIENT
Start: 2017-10-16 | End: 2018-10-11 | Stop reason: SDUPTHER

## 2017-10-16 NOTE — TELEPHONE ENCOUNTER
Spoke with pharmacist at [facility] and confirmed Rx for Androgel. Patient is to apply 3 pumps to skin once daily per Dr. Moise's note.

## 2017-10-16 NOTE — TELEPHONE ENCOUNTER
----- Message from Kailyn Chen sent at 10/16/2017  9:33 AM CDT -----  Contact: Nancy/[facility]  Nancy called to speak with the nurse about the Androgel Pump - they need clarification on the directions.    She can be contacted at [phone]. Ref # [id number]    Thanks,  Kailyn

## 2018-01-15 DIAGNOSIS — R52 PAIN: ICD-10-CM

## 2018-01-15 RX ORDER — MELOXICAM 7.5 MG/1
TABLET ORAL
Qty: 90 TABLET | Refills: 2 | Status: SHIPPED | OUTPATIENT
Start: 2018-01-15 | End: 2018-10-15 | Stop reason: SDUPTHER

## 2018-01-22 ENCOUNTER — LAB VISIT (OUTPATIENT)
Dept: LAB | Facility: HOSPITAL | Age: 63
End: 2018-01-22
Attending: FAMILY MEDICINE
Payer: COMMERCIAL

## 2018-01-22 DIAGNOSIS — E11.9 CONTROLLED TYPE 2 DIABETES MELLITUS WITHOUT COMPLICATION, WITHOUT LONG-TERM CURRENT USE OF INSULIN: ICD-10-CM

## 2018-01-22 DIAGNOSIS — E11.69 DYSLIPIDEMIA ASSOCIATED WITH TYPE 2 DIABETES MELLITUS: ICD-10-CM

## 2018-01-22 DIAGNOSIS — E78.5 DYSLIPIDEMIA ASSOCIATED WITH TYPE 2 DIABETES MELLITUS: ICD-10-CM

## 2018-01-22 LAB
ESTIMATED AVG GLUCOSE: 200 MG/DL
HBA1C MFR BLD HPLC: 8.6 %

## 2018-01-22 PROCEDURE — 80053 COMPREHEN METABOLIC PANEL: CPT

## 2018-01-22 PROCEDURE — 36415 COLL VENOUS BLD VENIPUNCTURE: CPT | Mod: PO

## 2018-01-22 PROCEDURE — 83036 HEMOGLOBIN GLYCOSYLATED A1C: CPT

## 2018-01-22 PROCEDURE — 80061 LIPID PANEL: CPT

## 2018-01-23 LAB
ALBUMIN SERPL BCP-MCNC: 3.7 G/DL
ALP SERPL-CCNC: 58 U/L
ALT SERPL W/O P-5'-P-CCNC: 37 U/L
ANION GAP SERPL CALC-SCNC: 9 MMOL/L
AST SERPL-CCNC: 19 U/L
BILIRUB SERPL-MCNC: 0.4 MG/DL
BUN SERPL-MCNC: 12 MG/DL
CALCIUM SERPL-MCNC: 9.2 MG/DL
CHLORIDE SERPL-SCNC: 104 MMOL/L
CHOLEST SERPL-MCNC: 268 MG/DL
CHOLEST/HDLC SERPL: 6.4 {RATIO}
CO2 SERPL-SCNC: 24 MMOL/L
CREAT SERPL-MCNC: 1.2 MG/DL
EST. GFR  (AFRICAN AMERICAN): >60 ML/MIN/1.73 M^2
EST. GFR  (NON AFRICAN AMERICAN): >60 ML/MIN/1.73 M^2
GLUCOSE SERPL-MCNC: 223 MG/DL
HDLC SERPL-MCNC: 42 MG/DL
HDLC SERPL: 15.7 %
LDLC SERPL CALC-MCNC: 196.8 MG/DL
NONHDLC SERPL-MCNC: 226 MG/DL
POTASSIUM SERPL-SCNC: 4.5 MMOL/L
PROT SERPL-MCNC: 6.6 G/DL
SODIUM SERPL-SCNC: 137 MMOL/L
TRIGL SERPL-MCNC: 146 MG/DL

## 2018-01-29 ENCOUNTER — OFFICE VISIT (OUTPATIENT)
Dept: INTERNAL MEDICINE | Facility: CLINIC | Age: 63
End: 2018-01-29
Payer: COMMERCIAL

## 2018-01-29 VITALS
HEART RATE: 76 BPM | TEMPERATURE: 99 F | DIASTOLIC BLOOD PRESSURE: 86 MMHG | SYSTOLIC BLOOD PRESSURE: 120 MMHG | WEIGHT: 299.38 LBS | HEIGHT: 72 IN | BODY MASS INDEX: 40.55 KG/M2

## 2018-01-29 DIAGNOSIS — E11.69 DYSLIPIDEMIA ASSOCIATED WITH TYPE 2 DIABETES MELLITUS: ICD-10-CM

## 2018-01-29 DIAGNOSIS — I12.9 HYPERTENSION ASSOCIATED WITH CHRONIC KIDNEY DISEASE DUE TO TYPE 2 DIABETES MELLITUS: ICD-10-CM

## 2018-01-29 DIAGNOSIS — E66.01 MORBID OBESITY DUE TO EXCESS CALORIES: ICD-10-CM

## 2018-01-29 DIAGNOSIS — E78.5 DYSLIPIDEMIA ASSOCIATED WITH TYPE 2 DIABETES MELLITUS: ICD-10-CM

## 2018-01-29 DIAGNOSIS — E11.22 HYPERTENSION ASSOCIATED WITH CHRONIC KIDNEY DISEASE DUE TO TYPE 2 DIABETES MELLITUS: ICD-10-CM

## 2018-01-29 PROCEDURE — 99214 OFFICE O/P EST MOD 30 MIN: CPT | Mod: S$GLB,,, | Performed by: FAMILY MEDICINE

## 2018-01-29 PROCEDURE — 99999 PR PBB SHADOW E&M-EST. PATIENT-LVL III: CPT | Mod: PBBFAC,,, | Performed by: FAMILY MEDICINE

## 2018-01-29 RX ORDER — PRAVASTATIN SODIUM 40 MG/1
40 TABLET ORAL DAILY
Qty: 90 TABLET | Refills: 3 | Status: SHIPPED | OUTPATIENT
Start: 2018-01-29 | End: 2018-05-23 | Stop reason: SDUPTHER

## 2018-01-29 NOTE — PROGRESS NOTES
"Subjective:      Patient ID: Aniket Brown is a 62 y.o. male.    Chief Complaint: Follow-up (6 mo )    HPI  61 yo male here to f/u on chronic conditions.  Stopped meds about 1-2 mos ago.  Under stress getting house back together.  Has had some intolerance to statins.  Unable to take crestor/lipitor.  Stopped his pravastatin.  Cont to remain active.  Does scuba diving regularly.  No CP/tightness.  No SOB, no fatigue.    Past Medical History:   Diagnosis Date    Diabetes 1.5, managed as type 2     Hyperlipidemia      Family History   Problem Relation Age of Onset    Early death Father     Diabetes Brother     Drug abuse Brother     Alcohol abuse Brother      Past Surgical History:   Procedure Laterality Date    APPENDECTOMY      COLONOSCOPY N/A 11/17/2016    Procedure: COLONOSCOPY;  Surgeon: Miguel Lance MD;  Location: [facility];  Service: Endoscopy;  Laterality: N/A;     Social History   Substance Use Topics    Smoking status: Never Smoker    Smokeless tobacco: Never Used    Alcohol use 0.0 oz/week      Comment: socially       /86   Pulse 76   Temp 98.6 °F (37 °C) (Tympanic)   Ht 5' 11.75" (1.822 m)   Wt 135.8 kg (299 lb 6.2 oz)   BMI 40.89 kg/m²     Review of Systems   Constitutional: Negative for activity change, appetite change, chills, diaphoresis, fatigue, fever and unexpected weight change.   HENT: Negative for hearing loss and tinnitus.    Eyes: Negative for visual disturbance.   Respiratory: Negative for cough, chest tightness, shortness of breath and wheezing.    Cardiovascular: Negative for chest pain, palpitations and leg swelling.   Gastrointestinal: Negative for abdominal distention, abdominal pain, blood in stool and rectal pain.   Genitourinary: Negative for difficulty urinating, discharge and testicular pain.   Musculoskeletal: Negative for arthralgias and back pain.   Neurological: Negative for dizziness, weakness and headaches.       Objective:     Physical Exam "   Constitutional: He is oriented to person, place, and time. He appears well-developed and well-nourished. No distress.   HENT:   Right Ear: External ear normal.   Left Ear: External ear normal.   Mouth/Throat: Oropharynx is clear and moist.   Eyes: Conjunctivae and EOM are normal. Pupils are equal, round, and reactive to light.   Neck: Normal range of motion. Neck supple. Carotid bruit is not present.   Cardiovascular: Normal rate, regular rhythm, normal heart sounds and intact distal pulses.    No murmur heard.  Pulses:       Dorsalis pedis pulses are 3+ on the right side, and 3+ on the left side.        Posterior tibial pulses are 3+ on the right side, and 3+ on the left side.   Pulmonary/Chest: Effort normal and breath sounds normal. No respiratory distress. He has no wheezes.   Abdominal: Soft. Bowel sounds are normal. He exhibits no distension.   Musculoskeletal: He exhibits no edema.   Feet:   Right Foot:   Protective Sensation: 6 sites tested. 6 sites sensed.   Skin Integrity: Negative for ulcer, blister or skin breakdown.   Left Foot:   Protective Sensation: 6 sites tested. 6 sites sensed.   Skin Integrity: Negative for ulcer, blister or skin breakdown.   Neurological: He is alert and oriented to person, place, and time. No cranial nerve deficit or sensory deficit.   Skin: Skin is warm and dry. No rash noted. He is not diaphoretic.   Psychiatric: He has a normal mood and affect. His behavior is normal. Judgment and thought content normal.   Nursing note and vitals reviewed.      Lab Results   Component Value Date    WBC 6.93 09/26/2016    HGB 15.8 09/26/2016    HCT 44.5 09/29/2017     09/26/2016    CHOL 268 (H) 01/22/2018    TRIG 146 01/22/2018    HDL 42 01/22/2018    ALT 37 01/22/2018    AST 19 01/22/2018     01/22/2018    K 4.5 01/22/2018     01/22/2018    CREATININE 1.2 01/22/2018    BUN 12 01/22/2018    CO2 24 01/22/2018    TSH 2.898 09/26/2016    PSA 2.0 09/29/2017    HGBA1C 8.6 (H)  01/22/2018       Assessment:     1. Uncontrolled type 2 diabetes mellitus without complication, without long-term current use of insulin    2. Dyslipidemia associated with type 2 diabetes mellitus    3. Morbid obesity due to excess calories    4. Hypertension associated with chronic kidney disease due to type 2 diabetes mellitus         Plan:     Uncontrolled type 2 diabetes mellitus without complication, without long-term current use of insulin  -     Hemoglobin A1c; Future; Expected date: 04/29/2018  -     Comprehensive metabolic panel; Future; Expected date: 04/29/2018    Dyslipidemia associated with type 2 diabetes mellitus  -     Lipid panel; Future; Expected date: 04/29/2018  -     Comprehensive metabolic panel; Future; Expected date: 04/29/2018    Morbid obesity due to excess calories    Hypertension associated with chronic kidney disease due to type 2 diabetes mellitus  -     Comprehensive metabolic panel; Future; Expected date: 04/29/2018    Other orders  -     pravastatin (PRAVACHOL) 40 MG tablet; Take 1 tablet (40 mg total) by mouth once daily.  Dispense: 90 tablet; Refill: 3    DM uncontrolled, start back on med and check BG daily.  Let MD know readings after a week or so and will increase to bid to get control.  Lipids uncontrolled.  Advised pt that his risks for heart disease/stroke are very high.  He needs a statin.  Will send the pravastatin 40mg to pharmacy and monitor.  BP stable, start back on daily ARB  Focus on cont weight loss  Declines flu shot  Start 81mg of ASA daily  F/u 3 mos

## 2018-04-09 ENCOUNTER — LAB VISIT (OUTPATIENT)
Dept: LAB | Facility: HOSPITAL | Age: 63
End: 2018-04-09
Attending: UROLOGY
Payer: COMMERCIAL

## 2018-04-09 DIAGNOSIS — E11.22 HYPERTENSION ASSOCIATED WITH CHRONIC KIDNEY DISEASE DUE TO TYPE 2 DIABETES MELLITUS: ICD-10-CM

## 2018-04-09 DIAGNOSIS — E11.69 DYSLIPIDEMIA ASSOCIATED WITH TYPE 2 DIABETES MELLITUS: ICD-10-CM

## 2018-04-09 DIAGNOSIS — E78.5 DYSLIPIDEMIA ASSOCIATED WITH TYPE 2 DIABETES MELLITUS: ICD-10-CM

## 2018-04-09 DIAGNOSIS — I12.9 HYPERTENSION ASSOCIATED WITH CHRONIC KIDNEY DISEASE DUE TO TYPE 2 DIABETES MELLITUS: ICD-10-CM

## 2018-04-09 DIAGNOSIS — E23.0 HYPOGONADOTROPIC HYPOGONADISM: Chronic | ICD-10-CM

## 2018-04-09 LAB
ALBUMIN SERPL BCP-MCNC: 4.1 G/DL
ALBUMIN SERPL BCP-MCNC: 4.1 G/DL
ALP SERPL-CCNC: 56 U/L
ALP SERPL-CCNC: 56 U/L
ALT SERPL W/O P-5'-P-CCNC: 38 U/L
ALT SERPL W/O P-5'-P-CCNC: 38 U/L
ANION GAP SERPL CALC-SCNC: 7 MMOL/L
AST SERPL-CCNC: 22 U/L
AST SERPL-CCNC: 22 U/L
BILIRUB DIRECT SERPL-MCNC: 0.2 MG/DL
BILIRUB SERPL-MCNC: 0.3 MG/DL
BILIRUB SERPL-MCNC: 0.3 MG/DL
BUN SERPL-MCNC: 18 MG/DL
CALCIUM SERPL-MCNC: 9.7 MG/DL
CHLORIDE SERPL-SCNC: 103 MMOL/L
CHOLEST SERPL-MCNC: 196 MG/DL
CHOLEST/HDLC SERPL: 4.9 {RATIO}
CO2 SERPL-SCNC: 28 MMOL/L
CREAT SERPL-MCNC: 1.2 MG/DL
EST. GFR  (AFRICAN AMERICAN): >60 ML/MIN/1.73 M^2
EST. GFR  (NON AFRICAN AMERICAN): >60 ML/MIN/1.73 M^2
ESTIMATED AVG GLUCOSE: 154 MG/DL
ESTRADIOL SERPL-MCNC: 24 PG/ML
GLUCOSE SERPL-MCNC: 147 MG/DL
HBA1C MFR BLD HPLC: 7 %
HCT VFR BLD AUTO: 47.9 %
HDLC SERPL-MCNC: 40 MG/DL
HDLC SERPL: 20.4 %
LDLC SERPL CALC-MCNC: 130.4 MG/DL
NONHDLC SERPL-MCNC: 156 MG/DL
POTASSIUM SERPL-SCNC: 5 MMOL/L
PROT SERPL-MCNC: 7.2 G/DL
PROT SERPL-MCNC: 7.2 G/DL
SODIUM SERPL-SCNC: 138 MMOL/L
TRIGL SERPL-MCNC: 128 MG/DL

## 2018-04-09 PROCEDURE — 80053 COMPREHEN METABOLIC PANEL: CPT

## 2018-04-09 PROCEDURE — 83036 HEMOGLOBIN GLYCOSYLATED A1C: CPT

## 2018-04-09 PROCEDURE — 80061 LIPID PANEL: CPT

## 2018-04-09 PROCEDURE — 80076 HEPATIC FUNCTION PANEL: CPT

## 2018-04-09 PROCEDURE — 36415 COLL VENOUS BLD VENIPUNCTURE: CPT | Mod: PO

## 2018-04-09 PROCEDURE — 85014 HEMATOCRIT: CPT

## 2018-04-09 PROCEDURE — 82670 ASSAY OF TOTAL ESTRADIOL: CPT

## 2018-04-10 ENCOUNTER — PATIENT OUTREACH (OUTPATIENT)
Dept: ADMINISTRATIVE | Facility: HOSPITAL | Age: 63
End: 2018-04-10

## 2018-04-16 ENCOUNTER — OFFICE VISIT (OUTPATIENT)
Dept: UROLOGY | Facility: CLINIC | Age: 63
End: 2018-04-16
Payer: COMMERCIAL

## 2018-04-16 VITALS — WEIGHT: 300 LBS | SYSTOLIC BLOOD PRESSURE: 132 MMHG | BODY MASS INDEX: 40.97 KG/M2 | DIASTOLIC BLOOD PRESSURE: 72 MMHG

## 2018-04-16 DIAGNOSIS — E23.0 HYPOGONADOTROPIC HYPOGONADISM: Chronic | ICD-10-CM

## 2018-04-16 DIAGNOSIS — N48.6 PEYRONIE DISEASE: Primary | ICD-10-CM

## 2018-04-16 DIAGNOSIS — E29.1 HYPOGONADISM IN MALE: ICD-10-CM

## 2018-04-16 PROCEDURE — 99214 OFFICE O/P EST MOD 30 MIN: CPT | Mod: S$GLB,,, | Performed by: UROLOGY

## 2018-04-16 PROCEDURE — 99999 PR PBB SHADOW E&M-EST. PATIENT-LVL II: CPT | Mod: PBBFAC,,, | Performed by: UROLOGY

## 2018-04-16 RX ORDER — TESTOSTERONE 20.25 MG/1.25G
3 GEL TOPICAL DAILY
Qty: 6 BOTTLE | Refills: 5 | Status: SHIPPED | OUTPATIENT
Start: 2018-04-16 | End: 2018-04-18 | Stop reason: SDUPTHER

## 2018-04-16 NOTE — PROGRESS NOTES
Chief Complaint: Hypogonadism    HPI:   4/16/18: No acute problems using a little less T.  Has cut back on T to two pumps a day.  Got some penile curvature dorsally about 30 degrees still able to use it.  Estradiol a normal number now.  10/9/17: Doing well for the most part except for the problems at his house.  Labs good but estradiol up.  3/29/17: No problems feeling well.  Been off the T a month starting to feel it.  9/29/16: Doing well labs appropriate.  Misses the T since he has been off a few weeks.  3/28/16: No problems doing well.  9/25/15: No problems with T satisfied, labs approp.  3/27/15: 60 yo man 7 yrs ago was worked up and diagnosed for hypogonadism (low energy, nausea, etc) and was started on depot T then gel now lately Androgel (1.63%) daily.  Recent LFT/hematocrit normal.  No urolithiasis history, gross hematuria, family prostate cancer history.  No abd/pelvic pain and no exac/rel factors.  Has been off T since November and the symptoms are back fully.  T is low on lab recheck.  Has had microhematuria for many years with cysto twice and full workup negative.    Allergies:  Crestor  [rosuvastatin]; Lipitor  [atorvastatin]; and Metformin    Medications:  has a current medication list which includes the following prescription(s): glimepiride, losartan, meloxicam, pravastatin, and testosterone.    Review of Systems:  General: No fever, chills, fatigability, or weight loss.  Skin: No rashes, itching, or changes in color or texture of skin.  Chest: Denies LEVY, cyanosis, wheezing, cough, and sputum production.  Abdomen: Appetite fine. No weight loss. Denies diarrhea, abdominal pain, hematemesis, or blood in stool.  Musculoskeletal: No joint stiffness or swelling. Denies back pain.  : As above.  All other review of systems negative.    PMH:   has a past medical history of Diabetes 1.5, managed as type 2 and Hyperlipidemia.    PSH:   has a past surgical history that includes Appendectomy and Colonoscopy  (N/A, 11/17/2016).    FamHx: family history includes Alcohol abuse in his brother; Diabetes in his brother; Drug abuse in his brother; Early death in his father.    SocHx:  reports that he has never smoked. He has never used smokeless tobacco. He reports that he drinks alcohol. He reports that he does not use drugs.      Physical Exam:  Vitals:    04/16/18 1044   BP: 132/72     General: A&Ox3, no apparent distress, no deformities  Neck: No masses, normal thyroid  Lungs: normal inspiration, no use of accessory muscles  Heart: normal pulse, no arrhythmias  Abdomen: Soft, NT, ND  Skin: The skin is warm and dry. No jaundice.  Ext: No c/c/e.  :   4/16/18: Penile plaque evident  10/9/17: Test desc willa, no abnormalities of epididymus. Penis normal, with normal penile and scrotal skin. Meatus normal. Normal rectal tone, no hemorrhoids. Prost 30 gm no nodules or masses appreciated. SV not palpable. Perineum and anus normal.    Labs/Studies:   PSA    3/15: 1.6    3/16: 2.2    10/17: 2.0    Impression/Plan:   1. Will rx androgel and check labs q6 mo with a visit each interval; RTC with me if things change.  2. Continue T at 2 pumps.  3. Low risk of prostate cancer  4. Discusse julia's

## 2018-04-18 DIAGNOSIS — E23.0 HYPOGONADOTROPIC HYPOGONADISM: Chronic | ICD-10-CM

## 2018-04-18 NOTE — TELEPHONE ENCOUNTER
"Received fax from patient's pharmacy requesting clarification for Androgel Pump. Rx states "3 Squirts by Percutaneous route once daily. 4 Gel in Metered-Dose Pump Transdermal Every day - Percutaneous" and your note states 2 pumps daily. Please provide clarification.   Thank you.  "

## 2018-04-19 RX ORDER — TESTOSTERONE 20.25 MG/1.25G
2 GEL TOPICAL DAILY
Qty: 6 BOTTLE | Refills: 5 | Status: SHIPPED | OUTPATIENT
Start: 2018-04-19 | End: 2018-10-29 | Stop reason: SDUPTHER

## 2018-04-23 ENCOUNTER — OFFICE VISIT (OUTPATIENT)
Dept: INTERNAL MEDICINE | Facility: CLINIC | Age: 63
End: 2018-04-23
Payer: COMMERCIAL

## 2018-04-23 VITALS
BODY MASS INDEX: 41.51 KG/M2 | SYSTOLIC BLOOD PRESSURE: 130 MMHG | WEIGHT: 306.44 LBS | HEART RATE: 72 BPM | DIASTOLIC BLOOD PRESSURE: 86 MMHG | TEMPERATURE: 98 F | HEIGHT: 72 IN

## 2018-04-23 DIAGNOSIS — E11.22 HYPERTENSION ASSOCIATED WITH CHRONIC KIDNEY DISEASE DUE TO TYPE 2 DIABETES MELLITUS: ICD-10-CM

## 2018-04-23 DIAGNOSIS — E11.69 DYSLIPIDEMIA ASSOCIATED WITH TYPE 2 DIABETES MELLITUS: ICD-10-CM

## 2018-04-23 DIAGNOSIS — E78.5 DYSLIPIDEMIA ASSOCIATED WITH TYPE 2 DIABETES MELLITUS: ICD-10-CM

## 2018-04-23 DIAGNOSIS — E66.01 MORBID OBESITY DUE TO EXCESS CALORIES: ICD-10-CM

## 2018-04-23 DIAGNOSIS — I12.9 HYPERTENSION ASSOCIATED WITH CHRONIC KIDNEY DISEASE DUE TO TYPE 2 DIABETES MELLITUS: ICD-10-CM

## 2018-04-23 DIAGNOSIS — E11.9 CONTROLLED TYPE 2 DIABETES MELLITUS WITHOUT COMPLICATION, WITHOUT LONG-TERM CURRENT USE OF INSULIN: Primary | ICD-10-CM

## 2018-04-23 DIAGNOSIS — Z29.9 PREVENTIVE MEASURE: ICD-10-CM

## 2018-04-23 PROCEDURE — 99214 OFFICE O/P EST MOD 30 MIN: CPT | Mod: S$GLB,,, | Performed by: FAMILY MEDICINE

## 2018-04-23 PROCEDURE — 99999 PR PBB SHADOW E&M-EST. PATIENT-LVL III: CPT | Mod: PBBFAC,,, | Performed by: FAMILY MEDICINE

## 2018-04-23 NOTE — PROGRESS NOTES
"Subjective:      Patient ID: Aniket Brown is a 62 y.o. male.    Chief Complaint:  F/U chronic conditions    HPI  63 yo male here for f/u on chronic conditions.  Taking his meds daily.  Checking BG once in awhile.  Trying to do better, had dropped some weight but recently eating more junk and picked it back up  No CP/SOB.  Bowels are moving well.    Past Medical History:   Diagnosis Date    Diabetes 1.5, managed as type 2     Hyperlipidemia      Family History   Problem Relation Age of Onset    Early death Father     Diabetes Brother     Drug abuse Brother     Alcohol abuse Brother      Past Surgical History:   Procedure Laterality Date    APPENDECTOMY      COLONOSCOPY N/A 11/17/2016    Procedure: COLONOSCOPY;  Surgeon: Miguel Lance MD;  Location: [facility];  Service: Endoscopy;  Laterality: N/A;     Social History   Substance Use Topics    Smoking status: Never Smoker    Smokeless tobacco: Never Used    Alcohol use 0.0 oz/week      Comment: socially       /86 (BP Location: Right arm, Patient Position: Sitting, BP Method: Large (Manual))   Pulse 72   Temp 98.3 °F (36.8 °C) (Tympanic)   Ht 5' 11.75" (1.822 m)   Wt (!) 139 kg (306 lb 7 oz)   BMI 41.85 kg/m²     Review of Systems   Constitutional: Negative for activity change, appetite change, chills, diaphoresis, fatigue, fever and unexpected weight change.   HENT: Negative for hearing loss and tinnitus.    Eyes: Negative for visual disturbance.   Respiratory: Negative for cough, chest tightness, shortness of breath and wheezing.    Cardiovascular: Negative for chest pain, palpitations and leg swelling.   Gastrointestinal: Negative for abdominal distention and abdominal pain.   Genitourinary: Negative for difficulty urinating.   Musculoskeletal: Negative for arthralgias and back pain.   Neurological: Negative for dizziness, weakness and headaches.       Objective:     Physical Exam   Constitutional: He is oriented to person, place, and " time. He appears well-developed and well-nourished. No distress.   HENT:   Right Ear: External ear normal.   Left Ear: External ear normal.   Nose: Nose normal.   Mouth/Throat: Oropharynx is clear and moist.   Eyes: Conjunctivae are normal. Pupils are equal, round, and reactive to light.   Neck: Normal range of motion. Neck supple.   Cardiovascular: Normal rate, regular rhythm and normal heart sounds.    Pulmonary/Chest: Effort normal and breath sounds normal. No respiratory distress.   Abdominal: Soft. Bowel sounds are normal. He exhibits no distension. There is no tenderness. There is no guarding.   Musculoskeletal: He exhibits no edema.   Neurological: He is alert and oriented to person, place, and time. No cranial nerve deficit.   Skin: Skin is warm and dry. No rash noted. He is not diaphoretic.   Psychiatric: He has a normal mood and affect. His behavior is normal. Judgment and thought content normal.   Nursing note and vitals reviewed.      Lab Results   Component Value Date    WBC 6.93 09/26/2016    HGB 15.8 09/26/2016    HCT 47.9 04/09/2018     09/26/2016    CHOL 196 04/09/2018    TRIG 128 04/09/2018    HDL 40 04/09/2018    ALT 38 04/09/2018    ALT 38 04/09/2018    AST 22 04/09/2018    AST 22 04/09/2018     04/09/2018    K 5.0 04/09/2018     04/09/2018    CREATININE 1.2 04/09/2018    BUN 18 04/09/2018    CO2 28 04/09/2018    TSH 2.898 09/26/2016    PSA 2.0 09/29/2017    HGBA1C 7.0 (H) 04/09/2018       Assessment:     1. Controlled type 2 diabetes mellitus without complication, without long-term current use of insulin    2. Dyslipidemia associated with type 2 diabetes mellitus    3. Hypertension associated with chronic kidney disease due to type 2 diabetes mellitus    4. Morbid obesity due to excess calories    5. Preventive measure         Plan:     Controlled type 2 diabetes mellitus without complication, without long-term current use of insulin  -     Microalbumin/creatinine urine ratio;  Future; Expected date: 10/23/2018    Dyslipidemia associated with type 2 diabetes mellitus    Hypertension associated with chronic kidney disease due to type 2 diabetes mellitus    Morbid obesity due to excess calories    Preventive measure  -     CBC auto differential; Future; Expected date: 10/23/2018  -     Comprehensive metabolic panel; Future; Expected date: 10/23/2018  -     Hemoglobin A1c; Future; Expected date: 10/23/2018  -     Lipid panel; Future; Expected date: 10/23/2018  -     TSH; Future; Expected date: 10/23/2018  -     PSA, Screening; Future; Expected date: 10/23/2018    DM with good control, cont current regimen  BP stable, cont ARB  Lipids improving, not at goal.  Try taking pravastatin 40mg bid and see if tolerable.  Has not tolerated lipitor/crestor in past.   Focus on better eating/exercise and overall weight loss  Had eye exam with retina specialist in Jan/DR. Mcgregor.  Get records  Declines pneumonia vaccine  F/u 6 mos with labs

## 2018-05-23 ENCOUNTER — OFFICE VISIT (OUTPATIENT)
Dept: INTERNAL MEDICINE | Facility: CLINIC | Age: 63
End: 2018-05-23
Payer: COMMERCIAL

## 2018-05-23 VITALS
SYSTOLIC BLOOD PRESSURE: 120 MMHG | DIASTOLIC BLOOD PRESSURE: 88 MMHG | HEIGHT: 72 IN | WEIGHT: 303.56 LBS | TEMPERATURE: 98 F | HEART RATE: 76 BPM | BODY MASS INDEX: 41.11 KG/M2

## 2018-05-23 DIAGNOSIS — R19.7 DIARRHEA, UNSPECIFIED TYPE: ICD-10-CM

## 2018-05-23 DIAGNOSIS — R19.4 BOWEL HABIT CHANGES: Primary | ICD-10-CM

## 2018-05-23 PROCEDURE — 99213 OFFICE O/P EST LOW 20 MIN: CPT | Mod: S$GLB,,, | Performed by: FAMILY MEDICINE

## 2018-05-23 PROCEDURE — 99999 PR PBB SHADOW E&M-EST. PATIENT-LVL III: CPT | Mod: PBBFAC,,, | Performed by: FAMILY MEDICINE

## 2018-05-23 RX ORDER — PRAVASTATIN SODIUM 80 MG/1
80 TABLET ORAL DAILY
Qty: 90 TABLET | Refills: 3 | Status: SHIPPED | OUTPATIENT
Start: 2018-05-23 | End: 2018-10-29 | Stop reason: SDUPTHER

## 2018-05-23 NOTE — PROGRESS NOTES
Subjective:      Patient ID: Aniket Brown is a 62 y.o. male.    Chief Complaint:  Bowel problem    HPI  63 yo male DM here with bowel issues.  He says at beginning of April he was seen by dentist and put on Clindamycin for 5 days.  He developed diarrhea day #4 and has had it off and on since.  He may go a week, where he feels like things are better except that he doesn't feel like he is emptying completely and then he will get loose stool and go several times a day.  Occ cramping with the loose stool.  No N/V, no fever/chills.  Has not had loose stool since last week.  Feeling ok overall but was just concerned as it comes/goes.  No blood in stool.    Past Medical History:   Diagnosis Date    Diabetes 1.5, managed as type 2     Hyperlipidemia      Family History   Problem Relation Age of Onset    Early death Father     Diabetes Brother     Drug abuse Brother     Alcohol abuse Brother      Past Surgical History:   Procedure Laterality Date    APPENDECTOMY      COLONOSCOPY N/A 11/17/2016    Procedure: COLONOSCOPY;  Surgeon: Miguel Lance MD;  Location: [facility];  Service: Endoscopy;  Laterality: N/A;     Social History   Substance Use Topics    Smoking status: Never Smoker    Smokeless tobacco: Never Used    Alcohol use 0.0 oz/week      Comment: socially       /88   Pulse 76   Temp 97.8 °F (36.6 °C) (Tympanic)   Ht 6' (1.829 m)   Wt (!) 137.7 kg (303 lb 9.2 oz)   BMI 41.17 kg/m²     Review of Systems   Gastrointestinal: Positive for diarrhea. Negative for abdominal pain, blood in stool, nausea and vomiting.       Objective:     Physical Exam   Constitutional: He appears well-developed and well-nourished.   Cardiovascular: Normal rate, regular rhythm and normal heart sounds.    Pulmonary/Chest: Effort normal and breath sounds normal. No respiratory distress.   Abdominal: Soft. Bowel sounds are normal. He exhibits no distension. There is no tenderness. There is no guarding.   Nursing note  and vitals reviewed.      Lab Results   Component Value Date    WBC 6.93 09/26/2016    HGB 15.8 09/26/2016    HCT 47.9 04/09/2018     09/26/2016    CHOL 196 04/09/2018    TRIG 128 04/09/2018    HDL 40 04/09/2018    ALT 38 04/09/2018    ALT 38 04/09/2018    AST 22 04/09/2018    AST 22 04/09/2018     04/09/2018    K 5.0 04/09/2018     04/09/2018    CREATININE 1.2 04/09/2018    BUN 18 04/09/2018    CO2 28 04/09/2018    TSH 2.898 09/26/2016    PSA 2.0 09/29/2017    HGBA1C 7.0 (H) 04/09/2018       Assessment:     1. Bowel habit changes    2. Diarrhea, unspecified type         Plan:     Bowel habit changes  -     Clostridium difficile EIA; Future; Expected date: 05/23/2018  -     Stool culture; Future; Expected date: 05/23/2018    Diarrhea, unspecified type  -     Clostridium difficile EIA; Future; Expected date: 05/23/2018  -     Stool culture; Future; Expected date: 05/23/2018    Other orders  -     pravastatin (PRAVACHOL) 80 MG tablet; Take 1 tablet (80 mg total) by mouth once daily.  Dispense: 90 tablet; Refill: 3    Start probiotics/activia yogurt daily  Will send with stool supplies to do culture/C. Diff if diarrhea reoccurs  [place]iales/gentle diet and plenty of fluids recommended  F/u PRN

## 2018-10-11 RX ORDER — GLIMEPIRIDE 2 MG/1
TABLET ORAL
Qty: 90 TABLET | Refills: 3 | Status: SHIPPED | OUTPATIENT
Start: 2018-10-11 | End: 2019-06-20 | Stop reason: SDUPTHER

## 2018-10-11 RX ORDER — LOSARTAN POTASSIUM 25 MG/1
TABLET ORAL
Qty: 90 TABLET | Refills: 3 | Status: SHIPPED | OUTPATIENT
Start: 2018-10-11 | End: 2019-10-08 | Stop reason: SDUPTHER

## 2018-10-15 DIAGNOSIS — R52 PAIN: ICD-10-CM

## 2018-10-15 RX ORDER — MELOXICAM 7.5 MG/1
TABLET ORAL
Qty: 90 TABLET | Refills: 2 | Status: SHIPPED | OUTPATIENT
Start: 2018-10-15 | End: 2019-07-14 | Stop reason: SDUPTHER

## 2018-10-16 ENCOUNTER — PATIENT OUTREACH (OUTPATIENT)
Dept: INTERNAL MEDICINE | Facility: CLINIC | Age: 63
End: 2018-10-16

## 2018-10-16 ENCOUNTER — LAB VISIT (OUTPATIENT)
Dept: LAB | Facility: HOSPITAL | Age: 63
End: 2018-10-16
Attending: UROLOGY
Payer: COMMERCIAL

## 2018-10-16 DIAGNOSIS — E23.0 HYPOGONADOTROPIC HYPOGONADISM: Chronic | ICD-10-CM

## 2018-10-16 DIAGNOSIS — E29.1 HYPOGONADISM IN MALE: ICD-10-CM

## 2018-10-16 DIAGNOSIS — Z29.9 PREVENTIVE MEASURE: ICD-10-CM

## 2018-10-16 DIAGNOSIS — N48.6 PEYRONIE DISEASE: ICD-10-CM

## 2018-10-16 LAB
ALBUMIN SERPL BCP-MCNC: 4.1 G/DL
ALBUMIN SERPL BCP-MCNC: 4.1 G/DL
ALP SERPL-CCNC: 54 U/L
ALP SERPL-CCNC: 54 U/L
ALT SERPL W/O P-5'-P-CCNC: 35 U/L
ALT SERPL W/O P-5'-P-CCNC: 35 U/L
ANION GAP SERPL CALC-SCNC: 11 MMOL/L
AST SERPL-CCNC: 32 U/L
AST SERPL-CCNC: 32 U/L
BASOPHILS # BLD AUTO: 0.05 K/UL
BASOPHILS NFR BLD: 0.7 %
BILIRUB DIRECT SERPL-MCNC: 0.2 MG/DL
BILIRUB SERPL-MCNC: 0.5 MG/DL
BILIRUB SERPL-MCNC: 0.5 MG/DL
BUN SERPL-MCNC: 13 MG/DL
CALCIUM SERPL-MCNC: 9.9 MG/DL
CHLORIDE SERPL-SCNC: 104 MMOL/L
CHOLEST SERPL-MCNC: 180 MG/DL
CHOLEST/HDLC SERPL: 4.4 {RATIO}
CO2 SERPL-SCNC: 26 MMOL/L
COMPLEXED PSA SERPL-MCNC: 3 NG/ML
COMPLEXED PSA SERPL-MCNC: 3 NG/ML
CREAT SERPL-MCNC: 1.1 MG/DL
DIFFERENTIAL METHOD: NORMAL
EOSINOPHIL # BLD AUTO: 0.4 K/UL
EOSINOPHIL NFR BLD: 5.7 %
ERYTHROCYTE [DISTWIDTH] IN BLOOD BY AUTOMATED COUNT: 13.4 %
EST. GFR  (AFRICAN AMERICAN): >60 ML/MIN/1.73 M^2
EST. GFR  (NON AFRICAN AMERICAN): >60 ML/MIN/1.73 M^2
ESTIMATED AVG GLUCOSE: 163 MG/DL
GLUCOSE SERPL-MCNC: 108 MG/DL
HBA1C MFR BLD HPLC: 7.3 %
HCT VFR BLD AUTO: 46.8 %
HCT VFR BLD AUTO: 46.8 %
HDLC SERPL-MCNC: 41 MG/DL
HDLC SERPL: 22.8 %
HGB BLD-MCNC: 15.2 G/DL
IMM GRANULOCYTES # BLD AUTO: 0.02 K/UL
IMM GRANULOCYTES NFR BLD AUTO: 0.3 %
LDLC SERPL CALC-MCNC: 120.2 MG/DL
LYMPHOCYTES # BLD AUTO: 1.7 K/UL
LYMPHOCYTES NFR BLD: 23.1 %
MCH RBC QN AUTO: 29.6 PG
MCHC RBC AUTO-ENTMCNC: 32.5 G/DL
MCV RBC AUTO: 91 FL
MONOCYTES # BLD AUTO: 0.5 K/UL
MONOCYTES NFR BLD: 6.9 %
NEUTROPHILS # BLD AUTO: 4.7 K/UL
NEUTROPHILS NFR BLD: 63.3 %
NONHDLC SERPL-MCNC: 139 MG/DL
NRBC BLD-RTO: 0 /100 WBC
PLATELET # BLD AUTO: 256 K/UL
PMV BLD AUTO: 10.1 FL
POTASSIUM SERPL-SCNC: 5 MMOL/L
PROT SERPL-MCNC: 6.9 G/DL
PROT SERPL-MCNC: 6.9 G/DL
RBC # BLD AUTO: 5.13 M/UL
SODIUM SERPL-SCNC: 141 MMOL/L
TRIGL SERPL-MCNC: 94 MG/DL
TSH SERPL DL<=0.005 MIU/L-ACNC: 2.01 UIU/ML
WBC # BLD AUTO: 7.36 K/UL

## 2018-10-16 PROCEDURE — 84153 ASSAY OF PSA TOTAL: CPT

## 2018-10-16 PROCEDURE — 80061 LIPID PANEL: CPT

## 2018-10-16 PROCEDURE — 84443 ASSAY THYROID STIM HORMONE: CPT

## 2018-10-16 PROCEDURE — 85025 COMPLETE CBC W/AUTO DIFF WBC: CPT

## 2018-10-16 PROCEDURE — 83036 HEMOGLOBIN GLYCOSYLATED A1C: CPT

## 2018-10-16 PROCEDURE — 36415 COLL VENOUS BLD VENIPUNCTURE: CPT

## 2018-10-16 PROCEDURE — 80076 HEPATIC FUNCTION PANEL: CPT

## 2018-10-16 PROCEDURE — 80053 COMPREHEN METABOLIC PANEL: CPT

## 2018-10-16 NOTE — LETTER
October 16, 2018      We are seeing Aniket Brown, 1955, at [facility]. Garry Chandler MD is their primary care physician. To help with our [place] maintenance records could you please send the following:     MOST RECENT EYE EXAM     Please fax to [facility] at [phone], attention Ashely Sue.     Thank-you in advance for your assistance. If you have any questions or concerns please contact me at [phone].     Ashely EDWARDS LPN  Care Coordination Department  [facility]  [phone]

## 2018-10-29 ENCOUNTER — OFFICE VISIT (OUTPATIENT)
Dept: UROLOGY | Facility: CLINIC | Age: 63
End: 2018-10-29
Payer: COMMERCIAL

## 2018-10-29 ENCOUNTER — OFFICE VISIT (OUTPATIENT)
Dept: INTERNAL MEDICINE | Facility: CLINIC | Age: 63
End: 2018-10-29
Payer: COMMERCIAL

## 2018-10-29 VITALS — WEIGHT: 286.94 LBS | BODY MASS INDEX: 38.91 KG/M2

## 2018-10-29 VITALS
HEIGHT: 72 IN | DIASTOLIC BLOOD PRESSURE: 78 MMHG | HEART RATE: 74 BPM | SYSTOLIC BLOOD PRESSURE: 122 MMHG | WEIGHT: 288.38 LBS | BODY MASS INDEX: 39.06 KG/M2

## 2018-10-29 DIAGNOSIS — Z12.5 PROSTATE CANCER SCREENING: ICD-10-CM

## 2018-10-29 DIAGNOSIS — E29.1 HYPOGONADISM IN MALE: Primary | ICD-10-CM

## 2018-10-29 DIAGNOSIS — E11.65 UNCONTROLLED TYPE 2 DIABETES MELLITUS WITH HYPERGLYCEMIA: ICD-10-CM

## 2018-10-29 DIAGNOSIS — Z00.00 ROUTINE PHYSICAL EXAMINATION: Primary | ICD-10-CM

## 2018-10-29 DIAGNOSIS — E23.0 HYPOGONADOTROPIC HYPOGONADISM: Chronic | ICD-10-CM

## 2018-10-29 PROCEDURE — 99999 PR PBB SHADOW E&M-EST. PATIENT-LVL II: CPT | Mod: PBBFAC,,, | Performed by: UROLOGY

## 2018-10-29 PROCEDURE — 99214 OFFICE O/P EST MOD 30 MIN: CPT | Mod: S$GLB,,, | Performed by: UROLOGY

## 2018-10-29 PROCEDURE — 99999 PR PBB SHADOW E&M-EST. PATIENT-LVL III: CPT | Mod: PBBFAC,,, | Performed by: FAMILY MEDICINE

## 2018-10-29 PROCEDURE — 99396 PREV VISIT EST AGE 40-64: CPT | Mod: S$GLB,,, | Performed by: FAMILY MEDICINE

## 2018-10-29 RX ORDER — MONTELUKAST SODIUM 10 MG/1
10 TABLET ORAL NIGHTLY
Qty: 30 TABLET | Refills: 0 | Status: SHIPPED | OUTPATIENT
Start: 2018-10-29 | End: 2018-10-29 | Stop reason: SDUPTHER

## 2018-10-29 RX ORDER — FLUTICASONE PROPIONATE 50 MCG
2 SPRAY, SUSPENSION (ML) NASAL DAILY
Qty: 16 G | Refills: 3 | Status: SHIPPED | OUTPATIENT
Start: 2018-10-29 | End: 2022-01-12

## 2018-10-29 RX ORDER — PRAVASTATIN SODIUM 80 MG/1
80 TABLET ORAL DAILY
Qty: 90 TABLET | Refills: 3 | Status: SHIPPED | OUTPATIENT
Start: 2018-10-29 | End: 2019-11-04 | Stop reason: SDUPTHER

## 2018-10-29 RX ORDER — TESTOSTERONE 20.25 MG/1.25G
2 GEL TOPICAL DAILY
Qty: 6 BOTTLE | Refills: 5 | Status: SHIPPED | OUTPATIENT
Start: 2018-10-29 | End: 2019-05-30 | Stop reason: SDUPTHER

## 2018-10-29 RX ORDER — MONTELUKAST SODIUM 10 MG/1
10 TABLET ORAL NIGHTLY
Qty: 30 TABLET | Refills: 0 | Status: SHIPPED | OUTPATIENT
Start: 2018-10-29 | End: 2018-11-28

## 2018-10-29 RX ORDER — FLUTICASONE PROPIONATE 50 MCG
2 SPRAY, SUSPENSION (ML) NASAL DAILY
Qty: 16 G | Refills: 3 | Status: SHIPPED | OUTPATIENT
Start: 2018-10-29 | End: 2018-10-29 | Stop reason: SDUPTHER

## 2018-10-29 NOTE — PROGRESS NOTES
Chief Complaint: Hypogonadism    HPI:   10/29/18: No problems from T feeling fine.  Has cut back more, not missing it much.  Doing it when he feels a need.  4/16/18: No acute problems using a little less T.  Has cut back on T to two pumps a day.  Got some penile curvature dorsally about 30 degrees still able to use it.  Estradiol a normal number now.  10/9/17: Doing well for the most part except for the problems at his house.  Labs good but estradiol up.  3/29/17: No problems feeling well.  Been off the T a month starting to feel it.  9/29/16: Doing well labs appropriate.  Misses the T since he has been off a few weeks.  3/28/16: No problems doing well.  9/25/15: No problems with T satisfied, labs approp.  3/27/15: 60 yo man 7 yrs ago was worked up and diagnosed for hypogonadism (low energy, nausea, etc) and was started on depot T then gel now lately Androgel (1.63%) daily.  Recent LFT/hematocrit normal.  No urolithiasis history, gross hematuria, family prostate cancer history.  No abd/pelvic pain and no exac/rel factors.  Has been off T since November and the symptoms are back fully.  T is low on lab recheck.  Has had microhematuria for many years with cysto twice and full workup negative.    Allergies:  Crestor  [rosuvastatin]; Lipitor  [atorvastatin]; and Metformin    Medications:  has a current medication list which includes the following prescription(s): glimepiride, losartan, meloxicam, pravastatin, and testosterone.    Review of Systems:  General: No fever, chills, fatigability, or weight loss.  Skin: No rashes, itching, or changes in color or texture of skin.  Chest: Denies LEVY, cyanosis, wheezing, cough, and sputum production.  Abdomen: Appetite fine. No weight loss. Denies diarrhea, abdominal pain, hematemesis, or blood in stool.  Musculoskeletal: No joint stiffness or swelling. Denies back pain.  : As above.  All other review of systems negative.    PMH:   has a past medical history of Diabetes 1.5,  managed as type 2 and Hyperlipidemia.    PSH:   has a past surgical history that includes Appendectomy and COLONOSCOPY (N/A, 11/17/2016).    FamHx: family history includes Alcohol abuse in his brother; Diabetes in his brother; Drug abuse in his brother; Early death in his father.    SocHx:  reports that  has never smoked. he has never used smokeless tobacco. He reports that he drinks alcohol. He reports that he does not use drugs.      Physical Exam:  There were no vitals filed for this visit.  General: A&Ox3, no apparent distress, no deformities  Neck: No masses, normal thyroid  Lungs: normal inspiration, no use of accessory muscles  Heart: normal pulse, no arrhythmias  Abdomen: Soft, NT, ND  Skin: The skin is warm and dry. No jaundice.  Ext: No c/c/e.  :   4/16/18: Penile plaque evident  10/9/17: Test desc willa, no abnormalities of epididymus. Penis normal, with normal penile and scrotal skin. Meatus normal. Normal rectal tone, no hemorrhoids. Prost 30 gm no nodules or masses appreciated. SV not palpable. Perineum and anus normal.    Labs/Studies:   PSA    3/15: 1.6    3/16: 2.2    10/17: 2.0    10/18: 3.0    Impression/Plan:   1. Will rx androgel and check labs q6 mo with a visit each interval; RTC with me if things change.  2. Continue T at 2 pumps prn  3. Low risk of prostate cancer  4. Discusse julia's

## 2018-10-29 NOTE — PROGRESS NOTES
Subjective:      Patient ID: Aniket Brown is a 63 y.o. male.    Chief Complaint: Follow-up (6 months with labs )    HPI  64 yo male here for annual visit.  Followed by Dr. Moise for low T, using Androgel.  Using more as needed, reduced amounts.  Has dropped nearly 20 lbs since last visit.  Did Keto diet for some time.  Has nasal congestion/PND.  OTC makes him too drowzy.  Mucous is clear.  No fever/chills.    Past Medical History:   Diagnosis Date    Diabetes 1.5, managed as type 2     Hyperlipidemia      Family History   Problem Relation Age of Onset    Early death Father     Diabetes Brother     Drug abuse Brother     Alcohol abuse Brother      Past Surgical History:   Procedure Laterality Date    APPENDECTOMY      COLONOSCOPY N/A 11/17/2016    Procedure: COLONOSCOPY;  Surgeon: Miguel Lance MD;  Location: [facility];  Service: Endoscopy;  Laterality: N/A;    COLONOSCOPY N/A 11/17/2016    Performed by Miguel Lance MD at [facility]     Social History     Tobacco Use    Smoking status: Never Smoker    Smokeless tobacco: Never Used   Substance Use Topics    Alcohol use: Yes     Alcohol/week: 0.0 oz     Comment: socially    Drug use: No       /78   Pulse 74   Ht 6' (1.829 m)   Wt 130.8 kg (288 lb 5.8 oz)   BMI 39.11 kg/m²     Review of Systems   Constitutional: Negative for activity change, appetite change, chills, diaphoresis, fatigue, fever and unexpected weight change.   HENT: Negative for ear pain and sore throat.    Eyes: Negative for visual disturbance.   Respiratory: Negative for cough, chest tightness, shortness of breath and wheezing.    Cardiovascular: Negative for chest pain, palpitations and leg swelling.   Gastrointestinal: Negative for abdominal distention and abdominal pain.   Genitourinary: Negative for difficulty urinating, discharge and testicular pain.   Musculoskeletal: Negative for arthralgias and back pain.   Neurological: Negative for dizziness, weakness  and headaches.       Objective:     Physical Exam   Constitutional: He is oriented to person, place, and time. He appears well-developed and well-nourished. No distress.   HENT:   Right Ear: External ear normal.   Left Ear: External ear normal.   Nose: Nose normal.   Mouth/Throat: Oropharynx is clear and moist.   Eyes: Conjunctivae are normal. Pupils are equal, round, and reactive to light.   Neck: Normal range of motion. Neck supple.   Cardiovascular: Normal rate, regular rhythm and normal heart sounds.   Pulmonary/Chest: Effort normal and breath sounds normal. No respiratory distress. He has no wheezes.   Abdominal: Soft. Bowel sounds are normal. He exhibits no distension. There is no tenderness. There is no guarding.   Musculoskeletal: He exhibits no edema.   Neurological: He is alert and oriented to person, place, and time. No cranial nerve deficit.   Skin: Skin is warm and dry. No rash noted. He is not diaphoretic.   Psychiatric: He has a normal mood and affect. His behavior is normal. Judgment and thought content normal.   Nursing note and vitals reviewed.      Lab Results   Component Value Date    WBC 7.36 10/16/2018    HGB 15.2 10/16/2018    HCT 46.8 10/16/2018    HCT 46.8 10/16/2018     10/16/2018    CHOL 180 10/16/2018    TRIG 94 10/16/2018    HDL 41 10/16/2018    ALT 35 10/16/2018    ALT 35 10/16/2018    AST 32 10/16/2018    AST 32 10/16/2018     10/16/2018    K 5.0 10/16/2018     10/16/2018    CREATININE 1.1 10/16/2018    BUN 13 10/16/2018    CO2 26 10/16/2018    TSH 2.009 10/16/2018    PSA 3.0 10/16/2018    PSA 3.0 10/16/2018    HGBA1C 7.3 (H) 10/16/2018       Assessment:     1. Routine physical examination    2. Uncontrolled type 2 diabetes mellitus with hyperglycemia         Plan:     Routine physical examination    Uncontrolled type 2 diabetes mellitus with hyperglycemia  -     Hemoglobin A1c; Future; Expected date: 04/29/2019    Other orders  -     montelukast (SINGULAIR) 10 mg  tablet; Take 1 tablet (10 mg total) by mouth every evening.  Dispense: 30 tablet; Refill: 0  -     fluticasone (FLONASE) 50 mcg/actuation nasal spray; 2 sprays (100 mcg total) by Each Nare route once daily.  Dispense: 16 g; Refill: 3    Pt not taking the amaryl.  Take it nightly with other meds  Down on weight, cont to focus on losing  Lipids stable/avoid Keto.  Try Mediterranean diet  Start flonase/singulair nightly  Cont current meds, monitor urine alb/cr  Declines flu shot  F/u 6 mos

## 2018-12-28 ENCOUNTER — OFFICE VISIT (OUTPATIENT)
Dept: INTERNAL MEDICINE | Facility: CLINIC | Age: 63
End: 2018-12-28
Payer: COMMERCIAL

## 2018-12-28 VITALS
SYSTOLIC BLOOD PRESSURE: 106 MMHG | HEIGHT: 72 IN | WEIGHT: 288.13 LBS | OXYGEN SATURATION: 96 % | HEART RATE: 77 BPM | DIASTOLIC BLOOD PRESSURE: 80 MMHG | BODY MASS INDEX: 39.02 KG/M2 | TEMPERATURE: 97 F

## 2018-12-28 DIAGNOSIS — R05.9 COUGH: ICD-10-CM

## 2018-12-28 DIAGNOSIS — J01.90 ACUTE SINUSITIS, RECURRENCE NOT SPECIFIED, UNSPECIFIED LOCATION: Primary | ICD-10-CM

## 2018-12-28 PROCEDURE — 99999 PR PBB SHADOW E&M-EST. PATIENT-LVL IV: CPT | Mod: PBBFAC,,, | Performed by: NURSE PRACTITIONER

## 2018-12-28 PROCEDURE — 96372 THER/PROPH/DIAG INJ SC/IM: CPT | Mod: S$GLB,,, | Performed by: NURSE PRACTITIONER

## 2018-12-28 PROCEDURE — 99214 OFFICE O/P EST MOD 30 MIN: CPT | Mod: 25,S$GLB,, | Performed by: NURSE PRACTITIONER

## 2018-12-28 RX ORDER — BETAMETHASONE SODIUM PHOSPHATE AND BETAMETHASONE ACETATE 3; 3 MG/ML; MG/ML
6 INJECTION, SUSPENSION INTRA-ARTICULAR; INTRALESIONAL; INTRAMUSCULAR; SOFT TISSUE
Status: COMPLETED | OUTPATIENT
Start: 2018-12-28 | End: 2018-12-28

## 2018-12-28 RX ORDER — MONTELUKAST SODIUM 10 MG/1
10 TABLET ORAL NIGHTLY
Qty: 30 TABLET | Refills: 1 | Status: SHIPPED | OUTPATIENT
Start: 2018-12-28 | End: 2019-01-27

## 2018-12-28 RX ORDER — AMOXICILLIN AND CLAVULANATE POTASSIUM 875; 125 MG/1; MG/1
1 TABLET, FILM COATED ORAL 2 TIMES DAILY
Qty: 14 TABLET | Refills: 0 | Status: SHIPPED | OUTPATIENT
Start: 2018-12-28 | End: 2019-01-04

## 2018-12-28 RX ORDER — PROMETHAZINE HYDROCHLORIDE AND DEXTROMETHORPHAN HYDROBROMIDE 6.25; 15 MG/5ML; MG/5ML
5 SYRUP ORAL
Qty: 118 ML | Refills: 0 | Status: SHIPPED | OUTPATIENT
Start: 2018-12-28 | End: 2019-01-07

## 2018-12-28 RX ADMIN — BETAMETHASONE SODIUM PHOSPHATE AND BETAMETHASONE ACETATE 6 MG: 3; 3 INJECTION, SUSPENSION INTRA-ARTICULAR; INTRALESIONAL; INTRAMUSCULAR; SOFT TISSUE at 09:12

## 2018-12-28 NOTE — PROGRESS NOTES
CC:   Chief Complaint   Patient presents with    URI     HPI: This is a new problem.   Aniket Brown is a 63 y.o. male with a complaint of URI.  The current episode started in the past 4 days.   The problem has been gradually worsening.   Associated symptoms included nasal congestion, rhinorrhea, sore throat, cough.    Pertinent negatives include hemoptysis, dyspnea, wheezing   Treatments tried: Singular and Flonase has been used and this has provided no relief.     Review of patient's allergies indicates:   Allergen Reactions    Crestor  [rosuvastatin]      Other reaction(s): muscle cramps    Lipitor  [atorvastatin]      Other reaction(s): muscle cramps    Metformin Other (See Comments)     Causes depression       Outpatient Medications Prior to Visit   Medication Sig Dispense Refill    fluticasone (FLONASE) 50 mcg/actuation nasal spray 2 sprays (100 mcg total) by Each Nare route once daily. 16 g 3    glimepiride (AMARYL) 2 MG tablet TAKE 1 TABLET BEFORE BREAKFAST 90 tablet 3    losartan (COZAAR) 25 MG tablet TAKE 1 TABLET DAILY 90 tablet 3    meloxicam (MOBIC) 7.5 MG tablet TAKE 1 TABLET DAILY EVERY MORNING AS NEEDED FOR PAIN 90 tablet 2    pravastatin (PRAVACHOL) 80 MG tablet Take 1 tablet (80 mg total) by mouth once daily. 90 tablet 3    testosterone (ANDROGEL) 20.25 mg/1.25 gram (1.62 %) GlPm Apply 2 Pump topically once daily. 6 Bottle 5     No facility-administered medications prior to visit.         Physical Exam   /80 (BP Location: Left arm, Patient Position: Sitting, BP Method: Medium (Automatic))   Pulse 77   Temp 97.1 °F (36.2 °C) (Tympanic)   Ht 6' (1.829 m)   Wt 130.7 kg (288 lb 2.3 oz)   SpO2 96%   BMI 39.08 kg/m²   Constitutional: The patient appears well-developed and well-nourished.   Head: Normocephalic and atraumatic.   Right Ear: Tympanic membrane is dull.   Left Ear: Tympanic membrane is dull.    Nose: Mucosal edema and rhinorrhea present.   Mouth/Throat: Uvula is midline.  Posterior oropharyngeal erythema present. No oropharyngeal exudate.        THE MUCOSA IS BOGGY AND ERYTHEMATOUS.   .   Cardiovascular: Normal rate, regular rhythm, S1 normal, S2 normal and normal heart sounds.  Exam reveals no gallop, no S3, no S4 and no friction rub.    No murmur heard.  Pulmonary/Chest: Effort normal and breath sounds normal. No stridor. Not tachypneic. No respiratory distress. The patient has no wheezes. The patient has no rhonchi. The patient has no rales.   Skin: The patient is not diaphoretic.     Encounter Diagnoses   Name Primary?    Acute sinusitis, recurrence not specified, unspecified location Yes    Cough        PLAN:    Aniket was seen today for uri.    Diagnoses and all orders for this visit:    Acute sinusitis, recurrence not specified, unspecified location  -     amoxicillin-clavulanate 875-125mg (AUGMENTIN) 875-125 mg per tablet; Take 1 tablet by mouth 2 (two) times daily. Take with food for 7 days  -     montelukast (SINGULAIR) 10 mg tablet; Take 1 tablet (10 mg total) by mouth every evening.  -     betamethasone acetate-betamethasone sodium phosphate injection 6 mg    Cough  -     promethazine-dextromethorphan (PROMETHAZINE-DM) 6.25-15 mg/5 mL Syrp; Take 5 mLs by mouth every 4 to 6 hours as needed (cough). Do not drive/operate heavy machinery while taking this medication.      Medications Ordered This Encounter   Medications    amoxicillin-clavulanate 875-125mg (AUGMENTIN) 875-125 mg per tablet     Sig: Take 1 tablet by mouth 2 (two) times daily. Take with food for 7 days     Dispense:  14 tablet     Refill:  0    betamethasone acetate-betamethasone sodium phosphate injection 6 mg    montelukast (SINGULAIR) 10 mg tablet     Sig: Take 1 tablet (10 mg total) by mouth every evening.     Dispense:  30 tablet     Refill:  1    promethazine-dextromethorphan (PROMETHAZINE-DM) 6.25-15 mg/5 mL Syrp     Sig: Take 5 mLs by mouth every 4 to 6 hours as needed (cough). Do not drive/operate  heavy machinery while taking this medication.     Dispense:  118 mL     Refill:  0     No orders of the defined types were placed in this encounter.    Take medications as prescribed.  Hydrate and rest.  RTC if symptoms are worsening or changing significantly or if not improved by the end of therapy.

## 2019-05-27 ENCOUNTER — TELEPHONE (OUTPATIENT)
Dept: INTERNAL MEDICINE | Facility: CLINIC | Age: 64
End: 2019-05-27

## 2019-05-27 NOTE — TELEPHONE ENCOUNTER
Called and left message that we need to get him schedule for his lab appointment this week as he has an appt on 6-4-19 with Dr. Chandler and she will want the results.

## 2019-05-29 ENCOUNTER — LAB VISIT (OUTPATIENT)
Dept: LAB | Facility: HOSPITAL | Age: 64
End: 2019-05-29
Attending: UROLOGY
Payer: COMMERCIAL

## 2019-05-29 DIAGNOSIS — E11.65 UNCONTROLLED TYPE 2 DIABETES MELLITUS WITH HYPERGLYCEMIA: ICD-10-CM

## 2019-05-29 DIAGNOSIS — E23.0 HYPOGONADOTROPIC HYPOGONADISM: Chronic | ICD-10-CM

## 2019-05-29 DIAGNOSIS — Z12.5 PROSTATE CANCER SCREENING: ICD-10-CM

## 2019-05-29 DIAGNOSIS — E29.1 HYPOGONADISM IN MALE: ICD-10-CM

## 2019-05-29 LAB
ALBUMIN SERPL BCP-MCNC: 3.9 G/DL (ref 3.5–5.2)
ALP SERPL-CCNC: 50 U/L (ref 55–135)
ALT SERPL W/O P-5'-P-CCNC: 25 U/L (ref 10–44)
AST SERPL-CCNC: 15 U/L (ref 10–40)
BILIRUB DIRECT SERPL-MCNC: 0.2 MG/DL (ref 0.1–0.3)
BILIRUB SERPL-MCNC: 0.4 MG/DL (ref 0.1–1)
ESTIMATED AVG GLUCOSE: 174 MG/DL (ref 68–131)
HBA1C MFR BLD HPLC: 7.7 % (ref 4–5.6)
HCT VFR BLD AUTO: 45.8 % (ref 40–54)
PROT SERPL-MCNC: 6.7 G/DL (ref 6–8.4)

## 2019-05-29 PROCEDURE — 36415 COLL VENOUS BLD VENIPUNCTURE: CPT

## 2019-05-29 PROCEDURE — 83036 HEMOGLOBIN GLYCOSYLATED A1C: CPT

## 2019-05-29 PROCEDURE — 85014 HEMATOCRIT: CPT

## 2019-05-29 PROCEDURE — 80076 HEPATIC FUNCTION PANEL: CPT

## 2019-05-30 DIAGNOSIS — E23.0 HYPOGONADOTROPIC HYPOGONADISM: Chronic | ICD-10-CM

## 2019-05-30 RX ORDER — TESTOSTERONE 20.25 MG/1.25G
2 GEL TOPICAL DAILY
Qty: 6 BOTTLE | Refills: 5 | Status: SHIPPED | OUTPATIENT
Start: 2019-05-30 | End: 2019-07-01 | Stop reason: SDUPTHER

## 2019-05-30 NOTE — TELEPHONE ENCOUNTER
Rescheduled pt's Tuesday appt to 7/1. He will need refill on Androgel before appt. [facility]

## 2019-06-20 ENCOUNTER — OFFICE VISIT (OUTPATIENT)
Dept: INTERNAL MEDICINE | Facility: CLINIC | Age: 64
End: 2019-06-20
Payer: COMMERCIAL

## 2019-06-20 VITALS
HEART RATE: 82 BPM | WEIGHT: 303.38 LBS | DIASTOLIC BLOOD PRESSURE: 80 MMHG | SYSTOLIC BLOOD PRESSURE: 118 MMHG | TEMPERATURE: 99 F | HEIGHT: 72 IN | BODY MASS INDEX: 41.09 KG/M2

## 2019-06-20 DIAGNOSIS — E11.22 HYPERTENSION ASSOCIATED WITH CHRONIC KIDNEY DISEASE DUE TO TYPE 2 DIABETES MELLITUS: ICD-10-CM

## 2019-06-20 DIAGNOSIS — E66.01 MORBID OBESITY DUE TO EXCESS CALORIES: ICD-10-CM

## 2019-06-20 DIAGNOSIS — E11.65 UNCONTROLLED TYPE 2 DIABETES MELLITUS WITH HYPERGLYCEMIA: Primary | ICD-10-CM

## 2019-06-20 DIAGNOSIS — I12.9 HYPERTENSION ASSOCIATED WITH CHRONIC KIDNEY DISEASE DUE TO TYPE 2 DIABETES MELLITUS: ICD-10-CM

## 2019-06-20 DIAGNOSIS — Z29.9 PREVENTIVE MEASURE: ICD-10-CM

## 2019-06-20 DIAGNOSIS — J01.90 ACUTE SINUSITIS, RECURRENCE NOT SPECIFIED, UNSPECIFIED LOCATION: ICD-10-CM

## 2019-06-20 PROCEDURE — 99999 PR PBB SHADOW E&M-EST. PATIENT-LVL III: ICD-10-PCS | Mod: PBBFAC,,, | Performed by: FAMILY MEDICINE

## 2019-06-20 PROCEDURE — 99214 PR OFFICE/OUTPT VISIT, EST, LEVL IV, 30-39 MIN: ICD-10-PCS | Mod: S$GLB,,, | Performed by: FAMILY MEDICINE

## 2019-06-20 PROCEDURE — 99214 OFFICE O/P EST MOD 30 MIN: CPT | Mod: S$GLB,,, | Performed by: FAMILY MEDICINE

## 2019-06-20 PROCEDURE — 99999 PR PBB SHADOW E&M-EST. PATIENT-LVL III: CPT | Mod: PBBFAC,,, | Performed by: FAMILY MEDICINE

## 2019-06-20 RX ORDER — AZITHROMYCIN 250 MG/1
TABLET, FILM COATED ORAL
Qty: 6 TABLET | Refills: 0 | Status: SHIPPED | OUTPATIENT
Start: 2019-06-20 | End: 2019-06-25

## 2019-06-20 RX ORDER — GLIMEPIRIDE 2 MG/1
2 TABLET ORAL 2 TIMES DAILY
Qty: 180 TABLET | Refills: 3 | Status: SHIPPED | OUTPATIENT
Start: 2019-06-20 | End: 2019-11-11 | Stop reason: SDUPTHER

## 2019-06-23 PROBLEM — E66.01 MORBID OBESITY DUE TO EXCESS CALORIES: Status: ACTIVE | Noted: 2019-06-23

## 2019-06-23 NOTE — PROGRESS NOTES
Subjective:      Patient ID: Aniket Brown is a 63 y.o. male.    Chief Complaint: Follow-up    HPI  62 yo male with DM, HTN, dyslipidemia, obesity here for f/u.  Went back to work, staying here in apt thru week and going home on weekend.  Diet is poor.  Weight is up 15 lbs.  Denies CP/SOB  Bowels moving well  States he is taking all meds  He is having sinus pain/pressure now and has for past week or so.  Felt like he has run fever off/on.  No wheezing.  IS able to cough up mucous.    Past Medical History:   Diagnosis Date    Diabetes 1.5, managed as type 2     Generalized OA     Hyperlipidemia     Hypogonadotropic hypogonadism     Managed by Dr. Moise    Morbid obesity      Family History   Problem Relation Age of Onset    Early death Father     Diabetes Brother     Drug abuse Brother     Alcohol abuse Brother      Past Surgical History:   Procedure Laterality Date    APPENDECTOMY      COLONOSCOPY N/A 11/17/2016    Performed by Miguel Lance MD at [facility] ENDO     Social History     Tobacco Use    Smoking status: Never Smoker    Smokeless tobacco: Never Used   Substance Use Topics    Alcohol use: Yes     Alcohol/week: 0.0 oz     Comment: socially    Drug use: No       /80   Pulse 82   Temp 98.6 °F (37 °C) (Oral)   Ht 6' (1.829 m)   Wt (!) 137.6 kg (303 lb 5.7 oz)   BMI 41.14 kg/m²     Review of Systems   Constitutional: Positive for fever.   HENT: Positive for congestion, sinus pressure and sinus pain.    Respiratory: Positive for cough.    Cardiovascular: Negative for chest pain.   Endocrine: Negative for polydipsia and polyuria.       Objective:     Physical Exam   Constitutional: He is oriented to person, place, and time. He appears well-developed and well-nourished. No distress.   HENT:   Right Ear: External ear normal.   Left Ear: External ear normal.   Nose: Nose normal.   Mouth/Throat: Oropharynx is clear and moist.   Eyes: Pupils are equal, round, and reactive to light.  Conjunctivae are normal.   Neck: Normal range of motion. Neck supple.   Cardiovascular: Normal rate, regular rhythm and normal heart sounds.   Pulses:       Dorsalis pedis pulses are 3+ on the right side, and 3+ on the left side.        Posterior tibial pulses are 3+ on the right side, and 3+ on the left side.   Pulmonary/Chest: Effort normal and breath sounds normal. No respiratory distress. He has no wheezes.   Abdominal: Soft. Bowel sounds are normal. He exhibits no distension. There is no tenderness. There is no guarding.   Musculoskeletal: He exhibits no edema.   Feet:   Right Foot:   Protective Sensation: 6 sites tested. 6 sites sensed.   Left Foot:   Protective Sensation: 6 sites tested. 6 sites sensed.   Neurological: He is alert and oriented to person, place, and time. No cranial nerve deficit.   Skin: Skin is warm and dry. No rash noted. He is not diaphoretic.   Psychiatric: He has a normal mood and affect. His behavior is normal. Judgment and thought content normal.   Nursing note and vitals reviewed.      Lab Results   Component Value Date    WBC 7.36 10/16/2018    HGB 15.2 10/16/2018    HCT 45.8 05/29/2019     10/16/2018    CHOL 180 10/16/2018    TRIG 94 10/16/2018    HDL 41 10/16/2018    ALT 25 05/29/2019    AST 15 05/29/2019     10/16/2018    K 5.0 10/16/2018     10/16/2018    CREATININE 1.1 10/16/2018    BUN 13 10/16/2018    CO2 26 10/16/2018    TSH 2.009 10/16/2018    PSA 3.0 10/16/2018    PSA 3.0 10/16/2018    HGBA1C 7.7 (H) 05/29/2019       Assessment:     1. Uncontrolled type 2 diabetes mellitus with hyperglycemia    2. Acute sinusitis, recurrence not specified, unspecified location    3. Hypertension associated with chronic kidney disease due to type 2 diabetes mellitus    4. Preventive measure    5. Morbid obesity due to excess calories         Plan:     Uncontrolled type 2 diabetes mellitus with hyperglycemia  -     Hemoglobin A1c; Future; Expected date: 10/20/2019  -      Microalbumin/creatinine urine ratio; Future; Expected date: 10/20/2019    Acute sinusitis, recurrence not specified, unspecified location    Hypertension associated with chronic kidney disease due to type 2 diabetes mellitus    Preventive measure  -     CBC auto differential; Future; Expected date: 10/20/2019  -     Comprehensive metabolic panel; Future; Expected date: 10/20/2019  -     Hemoglobin A1c; Future; Expected date: 10/20/2019  -     Lipid panel; Future; Expected date: 10/20/2019  -     TSH; Future; Expected date: 10/20/2019  -     PSA, Screening; Future; Expected date: 10/20/2019    Morbid obesity due to excess calories    Other orders  -     glimepiride (AMARYL) 2 MG tablet; Take 1 tablet (2 mg total) by mouth 2 (two) times daily.  Dispense: 180 tablet; Refill: 3  -     azithromycin (Z-FRITZ) 250 MG tablet; Take 2 tablets by mouth on day 1; Take 1 tablet by mouth on days 2-5  Dispense: 6 tablet; Refill: 0    BP stable  DM uncontrolled due to weight gain/poor diet.  Increase amaryl to 2mg bid and focus on better eating/exercise/weight loss.  Short term f/u with labs  Lipids been stable  Start zpak/flonase for sinus.  No steroids due to elevated glucose  F/u with labs

## 2019-07-01 ENCOUNTER — OFFICE VISIT (OUTPATIENT)
Dept: UROLOGY | Facility: CLINIC | Age: 64
End: 2019-07-01
Payer: COMMERCIAL

## 2019-07-01 VITALS
WEIGHT: 303.38 LBS | SYSTOLIC BLOOD PRESSURE: 138 MMHG | HEART RATE: 104 BPM | HEIGHT: 72 IN | DIASTOLIC BLOOD PRESSURE: 86 MMHG | BODY MASS INDEX: 41.09 KG/M2

## 2019-07-01 DIAGNOSIS — E23.0 HYPOGONADOTROPIC HYPOGONADISM: Chronic | ICD-10-CM

## 2019-07-01 PROCEDURE — 99999 PR PBB SHADOW E&M-EST. PATIENT-LVL III: ICD-10-PCS | Mod: PBBFAC,,, | Performed by: UROLOGY

## 2019-07-01 PROCEDURE — 99214 PR OFFICE/OUTPT VISIT, EST, LEVL IV, 30-39 MIN: ICD-10-PCS | Mod: S$GLB,,, | Performed by: UROLOGY

## 2019-07-01 PROCEDURE — 99214 OFFICE O/P EST MOD 30 MIN: CPT | Mod: S$GLB,,, | Performed by: UROLOGY

## 2019-07-01 PROCEDURE — 99999 PR PBB SHADOW E&M-EST. PATIENT-LVL III: CPT | Mod: PBBFAC,,, | Performed by: UROLOGY

## 2019-07-01 RX ORDER — TESTOSTERONE 20.25 MG/1.25G
3 GEL TOPICAL DAILY
Qty: 6 BOTTLE | Refills: 5 | Status: SHIPPED | OUTPATIENT
Start: 2019-07-01 | End: 2020-01-06 | Stop reason: SDUPTHER

## 2019-07-01 NOTE — PROGRESS NOTES
Chief Complaint: Hypogonadism    HPI:   7/1/19: Doing fine on T, working in a new position instrumenting tanks. 3 pumps/day.  10/29/18: No problems from T feeling fine.  Has cut back more, not missing it much.  Doing it when he feels a need.  4/16/18: No acute problems using a little less T.  Has cut back on T to two pumps a day.  Got some penile curvature dorsally about 30 degrees still able to use it.  Estradiol a normal number now.  10/9/17: Doing well for the most part except for the problems at his house.  Labs good but estradiol up.  3/29/17: No problems feeling well.  Been off the T a month starting to feel it.  9/29/16: Doing well labs appropriate.  Misses the T since he has been off a few weeks.  3/28/16: No problems doing well.  9/25/15: No problems with T satisfied, labs approp.  3/27/15: 58 yo man 7 yrs ago was worked up and diagnosed for hypogonadism (low energy, nausea, etc) and was started on depot T then gel now lately Androgel (1.63%) daily.  Recent LFT/hematocrit normal.  No urolithiasis history, gross hematuria, family prostate cancer history.  No abd/pelvic pain and no exac/rel factors.  Has been off T since November and the symptoms are back fully.  T is low on lab recheck.  Has had microhematuria for many years with cysto twice and full workup negative.    Allergies:  Crestor  [rosuvastatin]; Lipitor  [atorvastatin]; and Metformin    Medications:  has a current medication list which includes the following prescription(s): fluticasone propionate, glimepiride, losartan, meloxicam, pravastatin, and testosterone.    Review of Systems:  General: No fever, chills, fatigability, or weight loss.  Skin: No rashes, itching, or changes in color or texture of skin.  Chest: Denies LEVY, cyanosis, wheezing, cough, and sputum production.  Abdomen: Appetite fine. No weight loss. Denies diarrhea, abdominal pain, hematemesis, or blood in stool.  Musculoskeletal: No joint stiffness or swelling. Denies back  pain.  : As above.  All other review of systems negative.    PMH:   has a past medical history of Diabetes 1.5, managed as type 2, Generalized OA, Hyperlipidemia, Hypogonadotropic hypogonadism, and Morbid obesity.    PSH:   has a past surgical history that includes Appendectomy and Colonoscopy (N/A, 11/17/2016).    FamHx: family history includes Alcohol abuse in his brother; Diabetes in his brother; Drug abuse in his brother; Early death in his father.    SocHx:  reports that he has never smoked. He has never used smokeless tobacco. He reports that he drinks alcohol. He reports that he does not use drugs.      Physical Exam:  Vitals:    07/01/19 1640   BP: 138/86   Pulse: 104     General: A&Ox3, no apparent distress, no deformities  Neck: No masses, normal thyroid  Lungs: normal inspiration, no use of accessory muscles  Heart: normal pulse, no arrhythmias  Abdomen: Soft, NT, ND  Skin: The skin is warm and dry. No jaundice.  Ext: No c/c/e.  :   4/16/18: Penile plaque evident  10/9/17: Test desc willa, no abnormalities of epididymus. Penis normal, with normal penile and scrotal skin. Meatus normal. Normal rectal tone, no hemorrhoids. Prost 30 gm no nodules or masses appreciated. SV not palpable. Perineum and anus normal.    Labs/Studies:   PSA    3/15: 1.6    3/16: 2.2    10/17: 2.0    10/18: 3.0    Impression/Plan:   1. Will rx androgel and check labs q6 mo with a visit each interval; RTC with me if things change.  2. Continue T at 3 pumps/day, T labs next visit  3. Low risk of prostate cancer  4. Discusse julia's

## 2019-07-14 DIAGNOSIS — R52 PAIN: ICD-10-CM

## 2019-07-15 RX ORDER — MELOXICAM 7.5 MG/1
TABLET ORAL
Qty: 90 TABLET | Refills: 2 | Status: SHIPPED | OUTPATIENT
Start: 2019-07-15 | End: 2019-11-11 | Stop reason: SDUPTHER

## 2019-09-27 ENCOUNTER — TELEPHONE (OUTPATIENT)
Dept: INTERNAL MEDICINE | Facility: CLINIC | Age: 64
End: 2019-09-27

## 2019-10-08 RX ORDER — LOSARTAN POTASSIUM 25 MG/1
TABLET ORAL
Qty: 90 TABLET | Refills: 4 | Status: SHIPPED | OUTPATIENT
Start: 2019-10-08 | End: 2019-11-11 | Stop reason: SDUPTHER

## 2019-10-28 ENCOUNTER — LAB VISIT (OUTPATIENT)
Dept: LAB | Facility: HOSPITAL | Age: 64
End: 2019-10-28
Attending: FAMILY MEDICINE
Payer: MEDICARE

## 2019-10-28 DIAGNOSIS — Z29.9 PREVENTIVE MEASURE: ICD-10-CM

## 2019-10-28 DIAGNOSIS — E11.65 UNCONTROLLED TYPE 2 DIABETES MELLITUS WITH HYPERGLYCEMIA: ICD-10-CM

## 2019-10-28 LAB
ALBUMIN SERPL BCP-MCNC: 4.1 G/DL (ref 3.5–5.2)
ALP SERPL-CCNC: 55 U/L (ref 55–135)
ALT SERPL W/O P-5'-P-CCNC: 42 U/L (ref 10–44)
ANION GAP SERPL CALC-SCNC: 9 MMOL/L (ref 8–16)
AST SERPL-CCNC: 24 U/L (ref 10–40)
BASOPHILS # BLD AUTO: 0.09 K/UL (ref 0–0.2)
BASOPHILS NFR BLD: 1.2 % (ref 0–1.9)
BILIRUB SERPL-MCNC: 0.5 MG/DL (ref 0.1–1)
BUN SERPL-MCNC: 19 MG/DL (ref 8–23)
CALCIUM SERPL-MCNC: 9.5 MG/DL (ref 8.7–10.5)
CHLORIDE SERPL-SCNC: 105 MMOL/L (ref 95–110)
CHOLEST SERPL-MCNC: 244 MG/DL (ref 120–199)
CHOLEST/HDLC SERPL: 5.7 {RATIO} (ref 2–5)
CO2 SERPL-SCNC: 25 MMOL/L (ref 23–29)
COMPLEXED PSA SERPL-MCNC: 2.6 NG/ML (ref 0–4)
CREAT SERPL-MCNC: 1.2 MG/DL (ref 0.5–1.4)
DIFFERENTIAL METHOD: NORMAL
EOSINOPHIL # BLD AUTO: 0.2 K/UL (ref 0–0.5)
EOSINOPHIL NFR BLD: 2.6 % (ref 0–8)
ERYTHROCYTE [DISTWIDTH] IN BLOOD BY AUTOMATED COUNT: 13.3 % (ref 11.5–14.5)
EST. GFR  (AFRICAN AMERICAN): >60 ML/MIN/1.73 M^2
EST. GFR  (NON AFRICAN AMERICAN): >60 ML/MIN/1.73 M^2
ESTIMATED AVG GLUCOSE: 214 MG/DL (ref 68–131)
GLUCOSE SERPL-MCNC: 184 MG/DL (ref 70–110)
HBA1C MFR BLD HPLC: 9.1 % (ref 4–5.6)
HCT VFR BLD AUTO: 50.8 % (ref 40–54)
HDLC SERPL-MCNC: 43 MG/DL (ref 40–75)
HDLC SERPL: 17.6 % (ref 20–50)
HGB BLD-MCNC: 16.3 G/DL (ref 14–18)
IMM GRANULOCYTES # BLD AUTO: 0.03 K/UL (ref 0–0.04)
IMM GRANULOCYTES NFR BLD AUTO: 0.4 % (ref 0–0.5)
LDLC SERPL CALC-MCNC: 167 MG/DL (ref 63–159)
LYMPHOCYTES # BLD AUTO: 1.7 K/UL (ref 1–4.8)
LYMPHOCYTES NFR BLD: 23.4 % (ref 18–48)
MCH RBC QN AUTO: 29.4 PG (ref 27–31)
MCHC RBC AUTO-ENTMCNC: 32.1 G/DL (ref 32–36)
MCV RBC AUTO: 92 FL (ref 82–98)
MONOCYTES # BLD AUTO: 0.7 K/UL (ref 0.3–1)
MONOCYTES NFR BLD: 9.2 % (ref 4–15)
NEUTROPHILS # BLD AUTO: 4.6 K/UL (ref 1.8–7.7)
NEUTROPHILS NFR BLD: 63.2 % (ref 38–73)
NONHDLC SERPL-MCNC: 201 MG/DL
NRBC BLD-RTO: 0 /100 WBC
PLATELET # BLD AUTO: 223 K/UL (ref 150–350)
PMV BLD AUTO: 10.3 FL (ref 9.2–12.9)
POTASSIUM SERPL-SCNC: 4.4 MMOL/L (ref 3.5–5.1)
PROT SERPL-MCNC: 7 G/DL (ref 6–8.4)
RBC # BLD AUTO: 5.54 M/UL (ref 4.6–6.2)
SODIUM SERPL-SCNC: 139 MMOL/L (ref 136–145)
TRIGL SERPL-MCNC: 170 MG/DL (ref 30–150)
TSH SERPL DL<=0.005 MIU/L-ACNC: 3.34 UIU/ML (ref 0.4–4)
WBC # BLD AUTO: 7.21 K/UL (ref 3.9–12.7)

## 2019-10-28 PROCEDURE — 80053 COMPREHEN METABOLIC PANEL: CPT

## 2019-10-28 PROCEDURE — 83036 HEMOGLOBIN GLYCOSYLATED A1C: CPT

## 2019-10-28 PROCEDURE — 85025 COMPLETE CBC W/AUTO DIFF WBC: CPT

## 2019-10-28 PROCEDURE — 80061 LIPID PANEL: CPT

## 2019-10-28 PROCEDURE — 84443 ASSAY THYROID STIM HORMONE: CPT

## 2019-10-28 PROCEDURE — 36415 COLL VENOUS BLD VENIPUNCTURE: CPT | Mod: PO

## 2019-10-28 PROCEDURE — 84153 ASSAY OF PSA TOTAL: CPT

## 2019-11-04 ENCOUNTER — OFFICE VISIT (OUTPATIENT)
Dept: INTERNAL MEDICINE | Facility: CLINIC | Age: 64
End: 2019-11-04
Payer: MEDICARE

## 2019-11-04 VITALS
WEIGHT: 309.31 LBS | BODY MASS INDEX: 41.89 KG/M2 | TEMPERATURE: 98 F | HEIGHT: 72 IN | SYSTOLIC BLOOD PRESSURE: 122 MMHG | HEART RATE: 68 BPM | DIASTOLIC BLOOD PRESSURE: 86 MMHG

## 2019-11-04 DIAGNOSIS — E11.69 DYSLIPIDEMIA ASSOCIATED WITH TYPE 2 DIABETES MELLITUS: ICD-10-CM

## 2019-11-04 DIAGNOSIS — E11.65 UNCONTROLLED TYPE 2 DIABETES MELLITUS WITH HYPERGLYCEMIA: ICD-10-CM

## 2019-11-04 DIAGNOSIS — E78.5 DYSLIPIDEMIA ASSOCIATED WITH TYPE 2 DIABETES MELLITUS: ICD-10-CM

## 2019-11-04 DIAGNOSIS — Z00.00 ROUTINE PHYSICAL EXAMINATION: Primary | ICD-10-CM

## 2019-11-04 PROCEDURE — 99999 PR PBB SHADOW E&M-EST. PATIENT-LVL III: ICD-10-PCS | Mod: PBBFAC,,, | Performed by: FAMILY MEDICINE

## 2019-11-04 PROCEDURE — 99397 PER PM REEVAL EST PAT 65+ YR: CPT | Mod: S$GLB,,, | Performed by: FAMILY MEDICINE

## 2019-11-04 PROCEDURE — 99397 PR PREVENTIVE VISIT,EST,65 & OVER: ICD-10-PCS | Mod: S$GLB,,, | Performed by: FAMILY MEDICINE

## 2019-11-04 PROCEDURE — 99999 PR PBB SHADOW E&M-EST. PATIENT-LVL III: CPT | Mod: PBBFAC,,, | Performed by: FAMILY MEDICINE

## 2019-11-04 RX ORDER — PRAVASTATIN SODIUM 80 MG/1
80 TABLET ORAL DAILY
Qty: 90 TABLET | Refills: 3 | Status: SHIPPED | OUTPATIENT
Start: 2019-11-04 | End: 2019-11-11 | Stop reason: SDUPTHER

## 2019-11-04 NOTE — PROGRESS NOTES
Subjective:      Patient ID: Aniket Brown is a 64 y.o. male.    Chief Complaint: Annual Exam    HPI  65 yo male with DM, dyslipidemia, morbid obesity, hypotestosterone here for annual.  Followed by Uro/Dr. Moise as well.  Not taking his night time meds consistently  Has gained 6 lbs  Went back to work and [facility] [place] [facility] in [facility]  Eating out all the time  Not exercising  Feeling well  Has some epigastric discomfort at times and increased burping.  No N/V, no bowel changes.    Past Medical History:   Diagnosis Date    Diabetes 1.5, managed as type 2     Generalized OA     Hyperlipidemia     Hypogonadotropic hypogonadism     Managed by Dr. Moise    Morbid obesity      Family History   Problem Relation Age of Onset    Early death Father     Diabetes Brother     Drug abuse Brother     Alcohol abuse Brother      Past Surgical History:   Procedure Laterality Date    APPENDECTOMY      COLONOSCOPY N/A 11/17/2016    Procedure: COLONOSCOPY;  Surgeon: Miguel Lance MD;  Location: [facility];  Service: Endoscopy;  Laterality: N/A;     Social History     Tobacco Use    Smoking status: Never Smoker    Smokeless tobacco: Never Used   Substance Use Topics    Alcohol use: Yes     Alcohol/week: 0.0 standard drinks     Comment: socially    Drug use: No       /86 (BP Location: Right arm, Patient Position: Sitting, BP Method: X-Large (Manual))   Pulse 68   Temp 98.3 °F (36.8 °C) (Oral)   Ht 6' (1.829 m)   Wt (!) 140.3 kg (309 lb 4.9 oz)   BMI 41.95 kg/m²     Review of Systems   Constitutional: Negative for activity change, appetite change, chills, diaphoresis, fatigue, fever and unexpected weight change.   HENT: Negative for hearing loss and tinnitus.    Eyes: Negative for visual disturbance.   Respiratory: Negative for cough, chest tightness, shortness of breath and wheezing.    Cardiovascular: Negative for chest pain, palpitations and leg swelling.   Gastrointestinal:  Negative for abdominal distention, abdominal pain, constipation and diarrhea.   Genitourinary: Negative for difficulty urinating.   Musculoskeletal: Negative for arthralgias and back pain.   Neurological: Negative for dizziness, weakness and headaches.   Psychiatric/Behavioral: Negative for agitation.       Objective:     Physical Exam   Constitutional: He is oriented to person, place, and time. He appears well-developed and well-nourished. No distress.   HENT:   Right Ear: External ear normal.   Left Ear: External ear normal.   Nose: Nose normal.   Mouth/Throat: Oropharynx is clear and moist.   Eyes: Pupils are equal, round, and reactive to light. Conjunctivae are normal.   Neck: Normal range of motion. Neck supple.   Cardiovascular: Normal rate, regular rhythm and normal heart sounds.   No murmur heard.  Pulmonary/Chest: Effort normal and breath sounds normal. No respiratory distress. He has no wheezes.   Abdominal: Soft. Bowel sounds are normal. He exhibits no distension. There is no tenderness. There is no guarding.   Musculoskeletal: He exhibits no edema.   Neurological: He is alert and oriented to person, place, and time. No cranial nerve deficit.   Skin: Skin is warm and dry. No rash noted. He is not diaphoretic.   Psychiatric: He has a normal mood and affect. His behavior is normal. Judgment and thought content normal.   Nursing note and vitals reviewed.      Lab Results   Component Value Date    WBC 7.21 10/28/2019    HGB 16.3 10/28/2019    HCT 50.8 10/28/2019     10/28/2019    CHOL 244 (H) 10/28/2019    TRIG 170 (H) 10/28/2019    HDL 43 10/28/2019    ALT 42 10/28/2019    AST 24 10/28/2019     10/28/2019    K 4.4 10/28/2019     10/28/2019    CREATININE 1.2 10/28/2019    BUN 19 10/28/2019    CO2 25 10/28/2019    TSH 3.340 10/28/2019    PSA 2.6 10/28/2019    HGBA1C 9.1 (H) 10/28/2019       Assessment:     1. Routine physical examination    2. Uncontrolled type 2 diabetes mellitus with  hyperglycemia    3. Dyslipidemia associated with type 2 diabetes mellitus         Plan:     Routine physical examination    Uncontrolled type 2 diabetes mellitus with hyperglycemia  -     Hemoglobin A1c; Future; Expected date: 02/04/2020    Dyslipidemia associated with type 2 diabetes mellitus  -     Lipid panel; Future; Expected date: 02/04/2020    Other orders  -     pravastatin (PRAVACHOL) 80 MG tablet; Take 1 tablet (80 mg total) by mouth once daily.  Dispense: 90 tablet; Refill: 3  -     Discontinue: dulaglutide (TRULICITY) 1.5 mg/0.5 mL PnIj; Inject 1.5 mg into the skin once a week.  Dispense: 2 mL; Refill: 6  -     dulaglutide (TRULICITY) 1.5 mg/0.5 mL PnIj; Inject 1.5 mg into the skin once a week.  Dispense: 6 mL; Refill: 6    Reviewed labs with pt  Uncontrolled lipids/A1C  Add trulicity  Take amaryl 2 pills in AM  Take statin in AM  Work on better diet/exercise/weight loss  Suspect possible hiatal hernia.  Try antacid on regular basis  Declines flu and pneumonia shots  F/u 3 mos

## 2019-11-07 ENCOUNTER — TELEPHONE (OUTPATIENT)
Dept: INTERNAL MEDICINE | Facility: CLINIC | Age: 64
End: 2019-11-07

## 2019-11-07 NOTE — TELEPHONE ENCOUNTER
----- Message from Maria Alejandra Lyman sent at 11/7/2019 10:38 AM CST -----  Contact: pt  He's calling in regards to change pharm to ; [facility] [phone](fax)      pls call pt back at [phone] (home)

## 2019-11-11 ENCOUNTER — PATIENT MESSAGE (OUTPATIENT)
Dept: INTERNAL MEDICINE | Facility: CLINIC | Age: 64
End: 2019-11-11

## 2019-11-11 DIAGNOSIS — R52 PAIN: ICD-10-CM

## 2019-11-11 RX ORDER — PRAVASTATIN SODIUM 80 MG/1
80 TABLET ORAL DAILY
Qty: 90 TABLET | Refills: 3 | Status: SHIPPED | OUTPATIENT
Start: 2019-11-11 | End: 2020-11-05

## 2019-11-11 RX ORDER — MELOXICAM 7.5 MG/1
TABLET ORAL
Qty: 90 TABLET | Refills: 2 | Status: SHIPPED | OUTPATIENT
Start: 2019-11-11 | End: 2022-01-12

## 2019-11-11 RX ORDER — LOSARTAN POTASSIUM 25 MG/1
25 TABLET ORAL DAILY
Qty: 90 TABLET | Refills: 4 | Status: SHIPPED | OUTPATIENT
Start: 2019-11-11 | End: 2022-01-24

## 2019-11-11 RX ORDER — GLIMEPIRIDE 2 MG/1
2 TABLET ORAL 2 TIMES DAILY
Qty: 180 TABLET | Refills: 3 | Status: SHIPPED | OUTPATIENT
Start: 2019-11-11 | End: 2022-01-28 | Stop reason: SDUPTHER

## 2019-11-12 ENCOUNTER — TELEPHONE (OUTPATIENT)
Dept: INTERNAL MEDICINE | Facility: CLINIC | Age: 64
End: 2019-11-12

## 2019-11-12 NOTE — TELEPHONE ENCOUNTER
Pt said, he has changed to [facility] and need refills sent there. Informed pt that refills were sent to [facility] yesterday. He verbalized understanding.

## 2019-11-12 NOTE — TELEPHONE ENCOUNTER
----- Message from Barbara Beach sent at 11/12/2019  1:36 PM CST -----  Contact: pt  The pt request a call concerning a med refill, the pt gave no additional info and can be reached at [phone]///[id number]

## 2019-11-27 ENCOUNTER — PATIENT OUTREACH (OUTPATIENT)
Dept: ADMINISTRATIVE | Facility: HOSPITAL | Age: 64
End: 2019-11-27

## 2019-12-30 ENCOUNTER — LAB VISIT (OUTPATIENT)
Dept: LAB | Facility: HOSPITAL | Age: 64
End: 2019-12-30
Attending: UROLOGY
Payer: MEDICARE

## 2019-12-30 DIAGNOSIS — E23.0 HYPOGONADOTROPIC HYPOGONADISM: Chronic | ICD-10-CM

## 2019-12-30 PROCEDURE — 36415 COLL VENOUS BLD VENIPUNCTURE: CPT | Mod: PO

## 2019-12-30 PROCEDURE — 82040 ASSAY OF SERUM ALBUMIN: CPT

## 2020-01-04 LAB
ALBUMIN SERPL-MCNC: 4.2 G/DL (ref 3.6–5.1)
SHBG SERPL-SCNC: 16 NMOL/L (ref 22–77)
TESTOST FREE SERPL-MCNC: 82.3 PG/ML (ref 46–224)
TESTOST SERPL-MCNC: 363 NG/DL (ref 250–1100)
TESTOSTERONE.FREE+WB SERPL-MCNC: 158.5 NG/DL (ref 110–575)

## 2020-01-06 ENCOUNTER — OFFICE VISIT (OUTPATIENT)
Dept: UROLOGY | Facility: CLINIC | Age: 65
End: 2020-01-06
Payer: MEDICARE

## 2020-01-06 VITALS
WEIGHT: 303.25 LBS | BODY MASS INDEX: 41.13 KG/M2 | DIASTOLIC BLOOD PRESSURE: 98 MMHG | SYSTOLIC BLOOD PRESSURE: 134 MMHG

## 2020-01-06 DIAGNOSIS — I10 HYPERTENSION, UNSPECIFIED TYPE: ICD-10-CM

## 2020-01-06 DIAGNOSIS — E29.1 HYPOGONADISM IN MALE: ICD-10-CM

## 2020-01-06 DIAGNOSIS — Z12.5 PROSTATE CANCER SCREENING: Primary | ICD-10-CM

## 2020-01-06 DIAGNOSIS — E23.0 HYPOGONADOTROPIC HYPOGONADISM: Chronic | ICD-10-CM

## 2020-01-06 PROCEDURE — 99214 PR OFFICE/OUTPT VISIT, EST, LEVL IV, 30-39 MIN: ICD-10-PCS | Mod: S$GLB,,, | Performed by: UROLOGY

## 2020-01-06 PROCEDURE — 99999 PR PBB SHADOW E&M-EST. PATIENT-LVL II: CPT | Mod: PBBFAC,,, | Performed by: UROLOGY

## 2020-01-06 PROCEDURE — 99999 PR PBB SHADOW E&M-EST. PATIENT-LVL II: ICD-10-PCS | Mod: PBBFAC,,, | Performed by: UROLOGY

## 2020-01-06 PROCEDURE — 99214 OFFICE O/P EST MOD 30 MIN: CPT | Mod: S$GLB,,, | Performed by: UROLOGY

## 2020-01-06 RX ORDER — TESTOSTERONE 20.25 MG/1.25G
3 GEL TOPICAL DAILY
Qty: 6 BOTTLE | Refills: 5 | Status: SHIPPED | OUTPATIENT
Start: 2020-01-06 | End: 2020-07-29

## 2020-01-06 NOTE — PROGRESS NOTES
Chief Complaint: Hypogonadism    HPI:   1/6/20: No new problems.  T about the same. Reviewed history in detail. BP elev.  PSA acceptable.  7/1/19: Doing fine on T, working in a new position instrumenting tanks. 3 pumps/day.  10/29/18: No problems from T feeling fine.  Has cut back more, not missing it much.  Doing it when he feels a need.  4/16/18: No acute problems using a little less T.  Has cut back on T to two pumps a day.  Got some penile curvature dorsally about 30 degrees still able to use it.  Estradiol a normal number now.  10/9/17: Doing well for the most part except for the problems at his house.  Labs good but estradiol up.  3/29/17: No problems feeling well.  Been off the T a month starting to feel it.  9/29/16: Doing well labs appropriate.  Misses the T since he has been off a few weeks.  3/28/16: No problems doing well.  9/25/15: No problems with T satisfied, labs approp.  3/27/15: 60 yo man 7 yrs ago was worked up and diagnosed for hypogonadism (low energy, nausea, etc) and was started on depot T then gel now lately Androgel (1.63%) daily.  Recent LFT/hematocrit normal.  No urolithiasis history, gross hematuria, family prostate cancer history.  No abd/pelvic pain and no exac/rel factors.  Has been off T since November and the symptoms are back fully.  T is low on lab recheck.  Has had microhematuria for many years with cysto twice and full workup negative.    Allergies:  Crestor  [rosuvastatin]; Lipitor  [atorvastatin]; and Metformin    Medications:  has a current medication list which includes the following prescription(s): dulaglutide, glimepiride, losartan, meloxicam, pravastatin, testosterone, and fluticasone propionate.    Review of Systems:  General: No fever, chills, fatigability, or weight loss.  Skin: No rashes, itching, or changes in color or texture of skin.  Chest: Denies LEVY, cyanosis, wheezing, cough, and sputum production.  Abdomen: Appetite fine. No weight loss. Denies diarrhea,  abdominal pain, hematemesis, or blood in stool.  Musculoskeletal: No joint stiffness or swelling. Denies back pain.  : As above.  All other review of systems negative.    PMH:   has a past medical history of Diabetes 1.5, managed as type 2, Generalized OA, Hyperlipidemia, Hypogonadotropic hypogonadism, and Morbid obesity.    PSH:   has a past surgical history that includes Appendectomy and Colonoscopy (N/A, 11/17/2016).    FamHx: family history includes Alcohol abuse in his brother; Diabetes in his brother; Drug abuse in his brother; Early death in his father.    SocHx:  reports that he has never smoked. He has never used smokeless tobacco. He reports that he drinks alcohol. He reports that he does not use drugs.      Physical Exam:  Vitals:    01/06/20 1124   BP: (!) 134/98     General: A&Ox3, no apparent distress, no deformities  Neck: No masses, normal thyroid  Lungs: normal inspiration, no use of accessory muscles  Heart: normal pulse, no arrhythmias  Abdomen: Soft, NT, ND  Skin: The skin is warm and dry. No jaundice.  Ext: No c/c/e.  :   4/16/18: Penile plaque evident  10/9/17: Test desc willa, no abnormalities of epididymus. Penis normal, with normal penile and scrotal skin. Meatus normal. Normal rectal tone, no hemorrhoids. Prost 30 gm no nodules or masses appreciated. SV not palpable. Perineum and anus normal.    Labs/Studies:   PSA    3/15: 1.6    3/16: 2.2    10/17: 2.0    10/18: 3.0    10/19: 2.6    Impression/Plan:   1. Will rx androgel and check labs q6 mo with a visit each interval; RTC with me if things change.  2. Continue T at 3 pumps/day, T labs next visit  3. Low risk of prostate cancer  4. Discusse julia's its about the same

## 2020-02-04 ENCOUNTER — LAB VISIT (OUTPATIENT)
Dept: LAB | Facility: HOSPITAL | Age: 65
End: 2020-02-04
Attending: FAMILY MEDICINE
Payer: MEDICARE

## 2020-02-04 DIAGNOSIS — E78.5 DYSLIPIDEMIA ASSOCIATED WITH TYPE 2 DIABETES MELLITUS: ICD-10-CM

## 2020-02-04 DIAGNOSIS — E11.65 UNCONTROLLED TYPE 2 DIABETES MELLITUS WITH HYPERGLYCEMIA: ICD-10-CM

## 2020-02-04 DIAGNOSIS — E11.69 DYSLIPIDEMIA ASSOCIATED WITH TYPE 2 DIABETES MELLITUS: ICD-10-CM

## 2020-02-04 PROCEDURE — 36415 COLL VENOUS BLD VENIPUNCTURE: CPT | Mod: PO

## 2020-02-04 PROCEDURE — 80061 LIPID PANEL: CPT

## 2020-02-04 PROCEDURE — 83036 HEMOGLOBIN GLYCOSYLATED A1C: CPT

## 2020-02-05 LAB
CHOLEST SERPL-MCNC: 162 MG/DL (ref 120–199)
CHOLEST/HDLC SERPL: 4.8 {RATIO} (ref 2–5)
ESTIMATED AVG GLUCOSE: 157 MG/DL (ref 68–131)
HBA1C MFR BLD HPLC: 7.1 % (ref 4–5.6)
HDLC SERPL-MCNC: 34 MG/DL (ref 40–75)
HDLC SERPL: 21 % (ref 20–50)
LDLC SERPL CALC-MCNC: 102.8 MG/DL (ref 63–159)
NONHDLC SERPL-MCNC: 128 MG/DL
TRIGL SERPL-MCNC: 126 MG/DL (ref 30–150)

## 2020-02-06 ENCOUNTER — PATIENT OUTREACH (OUTPATIENT)
Dept: ADMINISTRATIVE | Facility: HOSPITAL | Age: 65
End: 2020-02-06

## 2020-02-06 RX ORDER — AMOXICILLIN 500 MG/1
TABLET, FILM COATED ORAL
COMMUNITY
Start: 2020-02-03 | End: 2020-08-25 | Stop reason: ALTCHOICE

## 2020-02-06 NOTE — PROGRESS NOTES
HM reviewed and updated. Immunizations abstracted.  Care Everywhere abstracted. No new results found.  Health Maintenance Due   Topic    DM Eye Exam SENT FAX TO OPTOMETERY    Pneumococcal Vaccine (65+ High/Highest Risk) (1 of 2 - PCV13)   Sent fax through epic to optometry for most recent dm eye exam.   Labs completed 2/4. CC note added.    Pt's last BP was 134/98 on Jan 6, 2020 in Urology.  Previsit chart audit completed.  *KDL*

## 2020-02-06 NOTE — LETTER
To whom it may concern     We are seeing Aniket Brown YOB: 1955, at [facility]. Garry Chandler MD is their primary care physician. To help with our [place] maintenance records could you please send the following:     Most recent copy of Diabetic Eye Exam that states Positive or Negative Retinopathy.    Please send fax to [phone], Attention Catherine MALLOY LPN Care Coordination.    Thank-you in advance for your assistance. If you have any questions or concerns, please don't hesitate to contact me at [phone].     Catherine MALLOY LPN  Care Coordination Department  [facility]  [phone]

## 2020-02-07 ENCOUNTER — PATIENT OUTREACH (OUTPATIENT)
Dept: ADMINISTRATIVE | Facility: HOSPITAL | Age: 65
End: 2020-02-07

## 2020-02-07 NOTE — PROGRESS NOTES
Patient on noncompliant A1C report. Report ran before pt completed labs. Labs done 2/4, resulted 7.1.  **KDL**

## 2020-02-11 ENCOUNTER — OFFICE VISIT (OUTPATIENT)
Dept: INTERNAL MEDICINE | Facility: CLINIC | Age: 65
End: 2020-02-11
Payer: MEDICARE

## 2020-02-11 VITALS
WEIGHT: 300.69 LBS | HEIGHT: 72 IN | BODY MASS INDEX: 40.73 KG/M2 | DIASTOLIC BLOOD PRESSURE: 74 MMHG | HEART RATE: 76 BPM | SYSTOLIC BLOOD PRESSURE: 100 MMHG | TEMPERATURE: 99 F

## 2020-02-11 DIAGNOSIS — I12.9 HYPERTENSION ASSOCIATED WITH CHRONIC KIDNEY DISEASE DUE TO TYPE 2 DIABETES MELLITUS: ICD-10-CM

## 2020-02-11 DIAGNOSIS — E78.5 DYSLIPIDEMIA ASSOCIATED WITH TYPE 2 DIABETES MELLITUS: ICD-10-CM

## 2020-02-11 DIAGNOSIS — E66.01 MORBID OBESITY DUE TO EXCESS CALORIES: ICD-10-CM

## 2020-02-11 DIAGNOSIS — E11.69 DYSLIPIDEMIA ASSOCIATED WITH TYPE 2 DIABETES MELLITUS: ICD-10-CM

## 2020-02-11 DIAGNOSIS — E11.22 HYPERTENSION ASSOCIATED WITH CHRONIC KIDNEY DISEASE DUE TO TYPE 2 DIABETES MELLITUS: ICD-10-CM

## 2020-02-11 DIAGNOSIS — E11.65 UNCONTROLLED TYPE 2 DIABETES MELLITUS WITH HYPERGLYCEMIA: Primary | ICD-10-CM

## 2020-02-11 PROCEDURE — 99999 PR PBB SHADOW E&M-EST. PATIENT-LVL III: ICD-10-PCS | Mod: PBBFAC,,, | Performed by: FAMILY MEDICINE

## 2020-02-11 PROCEDURE — 99214 OFFICE O/P EST MOD 30 MIN: CPT | Mod: S$GLB,,, | Performed by: FAMILY MEDICINE

## 2020-02-11 PROCEDURE — 99214 PR OFFICE/OUTPT VISIT, EST, LEVL IV, 30-39 MIN: ICD-10-PCS | Mod: S$GLB,,, | Performed by: FAMILY MEDICINE

## 2020-02-11 PROCEDURE — 99999 PR PBB SHADOW E&M-EST. PATIENT-LVL III: CPT | Mod: PBBFAC,,, | Performed by: FAMILY MEDICINE

## 2020-02-11 NOTE — PROGRESS NOTES
Subjective:      Patient ID: Aniket Brown is a 64 y.o. male.    Chief Complaint:  F/U DM/HTN/Lipids    HPI  65 yo male  Here for f/u on chronic conditions.  He has dropped 10 lbs from last visit.  Taking the trulicity and doing well with sugars.  On 80mg of statin  Some constipation for a few days after taking injection  Otherwise feeling good, no problems.  No CP/SOB    Past Medical History:   Diagnosis Date    Diabetes 1.5, managed as type 2     Generalized OA     Hyperlipidemia     Hypogonadotropic hypogonadism     Managed by Dr. Moise    Morbid obesity      Family History   Problem Relation Age of Onset    Early death Father     Diabetes Brother     Drug abuse Brother     Alcohol abuse Brother      Past Surgical History:   Procedure Laterality Date    APPENDECTOMY      COLONOSCOPY N/A 11/17/2016    Procedure: COLONOSCOPY;  Surgeon: Miguel Lance MD;  Location: [facility];  Service: Endoscopy;  Laterality: N/A;     Social History     Tobacco Use    Smoking status: Never Smoker    Smokeless tobacco: Never Used   Substance Use Topics    Alcohol use: Yes     Alcohol/week: 0.0 standard drinks     Comment: socially    Drug use: No       /74 (BP Location: Left arm, Patient Position: Sitting, BP Method: X-Large (Manual))   Pulse 76   Temp 98.9 °F (37.2 °C) (Oral)   Ht 6' (1.829 m)   Wt (!) 136.4 kg (300 lb 11.3 oz)   BMI 40.78 kg/m²     Review of Systems   Constitutional: Negative for activity change, appetite change, chills, diaphoresis, fatigue, fever and unexpected weight change.   HENT: Negative for hearing loss and tinnitus.    Eyes: Negative for visual disturbance.   Respiratory: Negative for cough, chest tightness, shortness of breath and wheezing.    Cardiovascular: Negative for chest pain, palpitations and leg swelling.   Gastrointestinal: Negative for abdominal distention and abdominal pain.   Genitourinary: Negative for difficulty urinating.   Musculoskeletal: Negative for  arthralgias and back pain.   Neurological: Negative for dizziness, weakness and headaches.       Objective:     Physical Exam   Constitutional: He is oriented to person, place, and time. He appears well-developed and well-nourished. No distress.   HENT:   Nose: Nose normal.   Mouth/Throat: Oropharynx is clear and moist.   Eyes: Pupils are equal, round, and reactive to light. Conjunctivae and EOM are normal.   Neck: Normal range of motion. Neck supple. Carotid bruit is not present. No thyromegaly present.   Cardiovascular: Normal rate, regular rhythm, normal heart sounds and intact distal pulses.   No murmur heard.  Pulses:       Dorsalis pedis pulses are 3+ on the right side, and 3+ on the left side.        Posterior tibial pulses are 3+ on the right side, and 3+ on the left side.   Pulmonary/Chest: Effort normal and breath sounds normal. No respiratory distress. He has no wheezes.   Abdominal: Soft. Bowel sounds are normal. He exhibits no distension. There is no tenderness. There is no guarding.   Musculoskeletal: Normal range of motion. He exhibits no edema, tenderness or deformity.   Feet:   Right Foot:   Protective Sensation: 6 sites tested. 6 sites sensed.   Skin Integrity: Negative for ulcer, blister or skin breakdown.   Left Foot:   Protective Sensation: 6 sites tested. 6 sites sensed.   Skin Integrity: Negative for ulcer, blister or skin breakdown.   Lymphadenopathy:     He has no cervical adenopathy.   Neurological: He is alert and oriented to person, place, and time. No cranial nerve deficit or sensory deficit.   Skin: Skin is warm and dry. No rash noted. He is not diaphoretic.   Psychiatric: He has a normal mood and affect. His behavior is normal. Judgment and thought content normal.   Nursing note and vitals reviewed.      Lab Results   Component Value Date    WBC 7.21 10/28/2019    HGB 16.3 10/28/2019    HCT 50.8 10/28/2019     10/28/2019    CHOL 162 02/04/2020    TRIG 126 02/04/2020    HDL 34  (L) 02/04/2020    ALT 42 10/28/2019    AST 24 10/28/2019     10/28/2019    K 4.4 10/28/2019     10/28/2019    CREATININE 1.2 10/28/2019    BUN 19 10/28/2019    CO2 25 10/28/2019    TSH 3.340 10/28/2019    PSA 2.6 10/28/2019    HGBA1C 7.1 (H) 02/04/2020       Assessment:     1. Uncontrolled type 2 diabetes mellitus with hyperglycemia    2. Dyslipidemia associated with type 2 diabetes mellitus    3. Hypertension associated with chronic kidney disease due to type 2 diabetes mellitus    4. Morbid obesity due to excess calories         Plan:     Uncontrolled type 2 diabetes mellitus with hyperglycemia  -     Hemoglobin A1c; Future; Expected date: 05/11/2020    Dyslipidemia associated with type 2 diabetes mellitus    Hypertension associated with chronic kidney disease due to type 2 diabetes mellitus    Morbid obesity due to excess calories    DM much improved, down to 7.1  Cont meds/focus on lifestyle/diet/exercise and more weight loss  BP stable/cont meds  Lipids improved/cont meds  Declines vaccines  F/u with labs in 3 mos

## 2020-03-24 ENCOUNTER — OFFICE VISIT (OUTPATIENT)
Dept: URGENT CARE | Facility: CLINIC | Age: 65
End: 2020-03-24
Payer: MEDICARE

## 2020-03-24 VITALS
RESPIRATION RATE: 18 BRPM | WEIGHT: 301.13 LBS | BODY MASS INDEX: 40.79 KG/M2 | SYSTOLIC BLOOD PRESSURE: 143 MMHG | HEIGHT: 72 IN | DIASTOLIC BLOOD PRESSURE: 98 MMHG | OXYGEN SATURATION: 96 % | TEMPERATURE: 98 F | HEART RATE: 84 BPM

## 2020-03-24 DIAGNOSIS — R19.7 DIARRHEA, UNSPECIFIED TYPE: Primary | ICD-10-CM

## 2020-03-24 PROCEDURE — 99213 PR OFFICE/OUTPT VISIT, EST, LEVL III, 20-29 MIN: ICD-10-PCS | Mod: S$GLB,,, | Performed by: NURSE PRACTITIONER

## 2020-03-24 PROCEDURE — 99213 OFFICE O/P EST LOW 20 MIN: CPT | Mod: S$GLB,,, | Performed by: NURSE PRACTITIONER

## 2020-03-24 NOTE — LETTER
March 24, 2020      [facility]  [address], [address]  [place] [address]  Phone: [phone]       Patient: Aniket Brown   YOB: 1955  Date of Visit: 03/24/2020    To Whom It May Concern:    Shivam Brown  was at [facility] on 03/24/2020. He may return to work/school on 3/25/2020 with no restrictions. If you have any questions or concerns, or if I can be of further assistance, please do not hesitate to contact me.    Sincerely,        Camilla Wolf, NP

## 2020-03-24 NOTE — PATIENT INSTRUCTIONS

## 2020-03-24 NOTE — PROGRESS NOTES
Subjective:       Patient ID: Aniket Brown is a 64 y.o. male.    Vitals:  height is 6' (1.829 m) and weight is 136.6 kg (301 lb 2.4 oz) (abnormal). His temperature is 98 °F (36.7 °C). His blood pressure is 143/98 (abnormal) and his pulse is 84. His respiration is 18 and oxygen saturation is 96%.     Chief Complaint: Diarrhea    Patient states he had 3 episodes of diarrhea this morning. Says he had a heat and serve dinner last night and thinks he did not heat it thoroughly. Denies fever, N/V, body aches, cough, shortness of breath, and headache. Has eaten since the episodes and tolerated well. Did not take anything for symptoms.    Diarrhea    This is a new problem. The current episode started today. The problem occurs 2 to 4 times per day. The problem has been gradually improving. The stool consistency is described as mucous and watery. The patient states that diarrhea awakens him from sleep. Associated symptoms include increased flatus. Pertinent negatives include no abdominal pain, arthralgias, bloating, chills, coughing, fever, headaches, myalgias, sweats, URI, vomiting or weight loss. Associated symptoms comments: Patient states he felt clammy, but feeling a bit better now.. Nothing aggravates the symptoms. Risk factors include suspect food intake. He has tried nothing for the symptoms. The treatment provided no relief. There is no history of bowel resection, inflammatory bowel disease, irritable bowel syndrome, malabsorption, a recent abdominal surgery or short gut syndrome.       Constitution: Negative for chills, fatigue, fever and generalized weakness.   HENT: Negative for congestion and sore throat.    Neck: Negative for painful lymph nodes.   Cardiovascular: Negative for chest pain and leg swelling.   Eyes: Negative for double vision and blurred vision.   Respiratory: Negative for cough and shortness of breath.    Gastrointestinal: Positive for diarrhea. Negative for abdominal pain, nausea and vomiting.    Genitourinary: Negative for dysuria, frequency and urgency.   Musculoskeletal: Negative for joint pain, joint swelling, muscle cramps and muscle ache.   Skin: Negative for color change, pale and rash.   Allergic/Immunologic: Negative for seasonal allergies.   Neurological: Negative for dizziness, history of vertigo, light-headedness, passing out and headaches.   Hematologic/Lymphatic: Negative for swollen lymph nodes, easy bruising/bleeding and history of blood clots. Does not bruise/bleed easily.   Psychiatric/Behavioral: Negative for nervous/anxious, sleep disturbance and depression. The patient is not nervous/anxious.        Objective:      Physical Exam   Constitutional: He is oriented to person, place, and time. He appears well-developed and well-nourished. He is cooperative.   HENT:   Head: Normocephalic and atraumatic.   Right Ear: External ear normal.   Left Ear: External ear normal.   Nose: Nose normal.   Mouth/Throat: Mucous membranes are normal.   Eyes: Conjunctivae and lids are normal.   Neck: Trachea normal and full passive range of motion without pain. Neck supple.   Cardiovascular: Normal rate, regular rhythm and normal heart sounds.   Pulmonary/Chest: Effort normal and breath sounds normal. No respiratory distress.   Abdominal: Soft. Normal appearance and bowel sounds are normal. He exhibits no distension, no abdominal bruit, no pulsatile midline mass and no mass. There is no tenderness.   Musculoskeletal: Normal range of motion. He exhibits no edema.   Neurological: He is alert and oriented to person, place, and time. He has normal strength.   Skin: Skin is warm, dry, intact, not diaphoretic and not pale.   Psychiatric: He has a normal mood and affect. His speech is normal and behavior is normal. Judgment and thought content normal. Cognition and memory are normal.   Nursing note and vitals reviewed.        Assessment:       1. Diarrhea, unspecified type        Plan:         Diarrhea, unspecified  type         · Ensure that you are maintaining adequate hydration. Alternate between water and an electrolyte replacement beverage (gatorade/powerade).   · Eat a bland diet as tolerated. Avoid spicy foods, dairy, and caffeineated food and beverages.   · Take tylenol for fever and body aches.  · Go to the ER for any severe abdominal pain, inability to tolerate liquids despite nausea medication, or for any pain to the right lower section of your abdomen.   · Follow up with PCP or return to clinic if symptoms don't improve, worsen, or if diarrhea greater than 7 days.

## 2020-03-26 ENCOUNTER — TELEPHONE (OUTPATIENT)
Dept: URGENT CARE | Facility: CLINIC | Age: 65
End: 2020-03-26

## 2020-04-23 ENCOUNTER — CLINICAL SUPPORT (OUTPATIENT)
Dept: URGENT CARE | Facility: CLINIC | Age: 65
End: 2020-04-23
Payer: MEDICARE

## 2020-04-23 ENCOUNTER — OFFICE VISIT (OUTPATIENT)
Dept: URGENT CARE | Facility: CLINIC | Age: 65
End: 2020-04-23
Payer: MEDICARE

## 2020-04-23 VITALS
OXYGEN SATURATION: 96 % | HEART RATE: 101 BPM | HEIGHT: 72 IN | BODY MASS INDEX: 40.63 KG/M2 | WEIGHT: 300 LBS | TEMPERATURE: 99 F

## 2020-04-23 DIAGNOSIS — R09.82 POST-NASAL DRIP: Primary | ICD-10-CM

## 2020-04-23 DIAGNOSIS — J06.9 UPPER RESPIRATORY TRACT INFECTION, UNSPECIFIED TYPE: ICD-10-CM

## 2020-04-23 PROCEDURE — 99214 PR OFFICE/OUTPT VISIT, EST, LEVL IV, 30-39 MIN: ICD-10-PCS | Mod: S$GLB,,, | Performed by: PHYSICIAN ASSISTANT

## 2020-04-23 PROCEDURE — 99214 OFFICE O/P EST MOD 30 MIN: CPT | Mod: S$GLB,,, | Performed by: PHYSICIAN ASSISTANT

## 2020-04-23 PROCEDURE — U0002 COVID-19 LAB TEST NON-CDC: HCPCS

## 2020-04-23 RX ORDER — AZELASTINE 1 MG/ML
1 SPRAY, METERED NASAL 2 TIMES DAILY
Qty: 30 ML | Refills: 0 | Status: SHIPPED | OUTPATIENT
Start: 2020-04-23 | End: 2020-08-25

## 2020-04-23 RX ORDER — FLUTICASONE PROPIONATE 50 MCG
2 SPRAY, SUSPENSION (ML) NASAL DAILY
Qty: 16 G | Refills: 0 | Status: SHIPPED | OUTPATIENT
Start: 2020-04-23 | End: 2020-05-23

## 2020-04-23 NOTE — PROGRESS NOTES
Subjective:       Patient ID: Aniket Brown is a 64 y.o. male.    Vitals:  height is 6' (1.829 m) and weight is 136.1 kg (300 lb). His oral temperature is 98.5 °F (36.9 °C). His pulse is 101. His oxygen saturation is 96%.     Chief Complaint: Sore Throat    Sore Throat    This is a new problem. The current episode started in the past 7 days. The problem has been gradually worsening. The pain is worse on the right side. There has been no fever. The pain is at a severity of 5/10. The pain is moderate. Associated symptoms include ear pain. Pertinent negatives include no congestion, coughing, shortness of breath, stridor or vomiting. He has tried gargles (nasal spray) for the symptoms. The treatment provided no relief.       Constitution: Positive for chills. Negative for sweating, fatigue and fever (subjective - feel chills ).   HENT: Positive for ear pain, sinus pressure (frontal ) and sore throat. Negative for congestion, sinus pain and voice change.         Denies loss of sense of taste or smell    Neck: Negative for painful lymph nodes.   Eyes: Negative for eye redness.   Respiratory: Negative for chest tightness, cough, sputum production, bloody sputum, COPD, shortness of breath, stridor, wheezing and asthma.    Gastrointestinal: Negative for nausea and vomiting.   Musculoskeletal: Negative for muscle ache.   Skin: Negative for rash.   Allergic/Immunologic: Positive for seasonal allergies. Negative for asthma.   Hematologic/Lymphatic: Negative for swollen lymph nodes.       Objective:      Physical Exam   Constitutional: He is oriented to person, place, and time. He appears well-developed and well-nourished. He is cooperative.  Non-toxic appearance. He does not have a sickly appearance. He does not appear ill. No distress.   HENT:   Head: Normocephalic and atraumatic.   Right Ear: Hearing, external ear and ear canal normal. A middle ear effusion is present.   Left Ear: Hearing, tympanic membrane, external ear and  ear canal normal.   Nose: No mucosal edema, rhinorrhea or nasal deformity. No epistaxis. Right sinus exhibits frontal sinus tenderness. Right sinus exhibits no maxillary sinus tenderness. Left sinus exhibits frontal sinus tenderness. Left sinus exhibits no maxillary sinus tenderness.   Mouth/Throat: Uvula is midline, oropharynx is clear and moist and mucous membranes are normal. No trismus in the jaw. Normal dentition. No uvula swelling. No oropharyngeal exudate, posterior oropharyngeal edema or posterior oropharyngeal erythema (hyperemia ).   Eyes: Conjunctivae and lids are normal. No scleral icterus.   Neck: Trachea normal, full passive range of motion without pain and phonation normal. Neck supple. No neck rigidity. No edema and no erythema present.   Cardiovascular: Normal rate, regular rhythm, normal heart sounds, intact distal pulses and normal pulses.   Pulmonary/Chest: Effort normal and breath sounds normal. No respiratory distress. He has no decreased breath sounds. He has no rhonchi.   Abdominal: Normal appearance.   Musculoskeletal: Normal range of motion. He exhibits no edema or deformity.   Neurological: He is alert and oriented to person, place, and time. He exhibits normal muscle tone. Coordination normal.   Skin: Skin is warm, dry, intact, not diaphoretic and not pale.   Psychiatric: He has a normal mood and affect. His speech is normal and behavior is normal. Judgment and thought content normal. Cognition and memory are normal.   Nursing note and vitals reviewed.        Assessment:       1. Post-nasal drip    2. Upper respiratory tract infection, unspecified type        Plan:         Post-nasal drip  -     fluticasone propionate (FLONASE) 50 mcg/actuation nasal spray; 2 sprays (100 mcg total) by Each Nostril route once daily.  Dispense: 16 g; Refill: 0  -     azelastine (ASTELIN) 137 mcg (0.1 %) nasal spray; 1 spray (137 mcg total) by Nasal route 2 (two) times daily.  Dispense: 30 mL; Refill:  0    Upper respiratory tract infection, unspecified type  -     azelastine (ASTELIN) 137 mcg (0.1 %) nasal spray; 1 spray (137 mcg total) by Nasal route 2 (two) times daily.  Dispense: 30 mL; Refill: 0  -     Cancel: COVID-19 Routine Screening  -     COVID-19 Routine Screening; Future; Expected date: 04/23/2020         URI/Sinusitis    -  flonase for nasal congestion/sinus tenderness    -  Nasal saline washes    -  Decongestant    -  Trial of astelin spray for pnd    -  Gargle with warm salt roger, lozenges, popsicles    -  With subjective fever (chills) (but no cough, SOB, loss of sense of taste or smell), I let Mr. Brown know that I had low suspicion for COVID-19, but offered to test. Per patient, he is still working, and he worries about possible exposure.  He would like to be tested.      Viridiana Herrera PA-C   Physician Assistant   [facility]

## 2020-04-23 NOTE — PATIENT INSTRUCTIONS
· Rest and increase fluids.   · May apply warm compresses as needed.   · Saline nasal spray or saline irrigation (Neti pot) to loosen nasal congestion.  · Flonase or Nasacort to reduce inflammation in the sinus cavities.  · astelin nasal spray for post nasal drip may help with sore throat  · For sore throat, gargle with warm salt water, lozenges, popsicles   · Follow up with your primary care provider or with ENT if not improved within a few days or sooner for any new or worsening symptoms.   · Go to the ER for any fever that does not improve with Tylenol/Ibuprofen, neck stiffness, rash, severe headache, vision changes, shortness of breath, chest pain, severe facial pain or swelling, or for any other new and concerning symptoms.

## 2020-04-25 ENCOUNTER — TELEPHONE (OUTPATIENT)
Dept: URGENT CARE | Facility: CLINIC | Age: 65
End: 2020-04-25

## 2020-04-25 LAB — SARS-COV-2 RNA RESP QL NAA+PROBE: NOT DETECTED

## 2020-04-25 NOTE — TELEPHONE ENCOUNTER
Informed patient of negative Covid-19 results. Pt requesting an excuse for work. Informed him it would be available via the portal or he could pick it up in clinic.

## 2020-08-24 ENCOUNTER — LAB VISIT (OUTPATIENT)
Dept: LAB | Facility: HOSPITAL | Age: 65
End: 2020-08-24
Attending: FAMILY MEDICINE
Payer: MEDICARE

## 2020-08-24 DIAGNOSIS — E11.65 UNCONTROLLED TYPE 2 DIABETES MELLITUS WITH HYPERGLYCEMIA: ICD-10-CM

## 2020-08-24 PROCEDURE — 36415 COLL VENOUS BLD VENIPUNCTURE: CPT | Mod: PO

## 2020-08-24 PROCEDURE — 83036 HEMOGLOBIN GLYCOSYLATED A1C: CPT

## 2020-08-25 ENCOUNTER — OFFICE VISIT (OUTPATIENT)
Dept: INTERNAL MEDICINE | Facility: CLINIC | Age: 65
End: 2020-08-25
Payer: MEDICARE

## 2020-08-25 VITALS
BODY MASS INDEX: 41.35 KG/M2 | HEART RATE: 84 BPM | DIASTOLIC BLOOD PRESSURE: 70 MMHG | TEMPERATURE: 98 F | WEIGHT: 305.31 LBS | HEIGHT: 72 IN | SYSTOLIC BLOOD PRESSURE: 108 MMHG

## 2020-08-25 DIAGNOSIS — E78.5 DYSLIPIDEMIA ASSOCIATED WITH TYPE 2 DIABETES MELLITUS: ICD-10-CM

## 2020-08-25 DIAGNOSIS — E11.65 UNCONTROLLED TYPE 2 DIABETES MELLITUS WITH HYPERGLYCEMIA: Primary | ICD-10-CM

## 2020-08-25 DIAGNOSIS — E11.69 DYSLIPIDEMIA ASSOCIATED WITH TYPE 2 DIABETES MELLITUS: ICD-10-CM

## 2020-08-25 DIAGNOSIS — E66.01 MORBID OBESITY DUE TO EXCESS CALORIES: ICD-10-CM

## 2020-08-25 DIAGNOSIS — Z12.5 PROSTATE CANCER SCREENING: ICD-10-CM

## 2020-08-25 LAB
ESTIMATED AVG GLUCOSE: 203 MG/DL (ref 68–131)
HBA1C MFR BLD HPLC: 8.7 % (ref 4–5.6)

## 2020-08-25 PROCEDURE — 99214 OFFICE O/P EST MOD 30 MIN: CPT | Mod: S$GLB,,, | Performed by: FAMILY MEDICINE

## 2020-08-25 PROCEDURE — 99999 PR PBB SHADOW E&M-EST. PATIENT-LVL III: CPT | Mod: PBBFAC,,, | Performed by: FAMILY MEDICINE

## 2020-08-25 PROCEDURE — 99999 PR PBB SHADOW E&M-EST. PATIENT-LVL III: ICD-10-PCS | Mod: PBBFAC,,, | Performed by: FAMILY MEDICINE

## 2020-08-25 PROCEDURE — 99214 PR OFFICE/OUTPT VISIT, EST, LEVL IV, 30-39 MIN: ICD-10-PCS | Mod: S$GLB,,, | Performed by: FAMILY MEDICINE

## 2020-08-25 NOTE — PROGRESS NOTES
Subjective:      Patient ID: Aniket Brown is a 64 y.o. male.    Chief Complaint: elevated sugars.    HPI  65 yo male presents for f/u on DM.  His BG have been high.  He stopped trulicity about 4 mos ago b/c of GI side effects.  He was feeling bad over the weekend.  Chills/clammy and bowels were constipated.  When he did go, some blood present.  That has resolved.  No CP/SOB  Weight is up  Not eating as well/exercising    Past Medical History:   Diagnosis Date    Diabetes 1.5, managed as type 2     Generalized OA     Hyperlipidemia     Hypogonadotropic hypogonadism     Managed by Dr. Moise    Morbid obesity      Family History   Problem Relation Age of Onset    Early death Father     Diabetes Brother     Drug abuse Brother     Alcohol abuse Brother      Past Surgical History:   Procedure Laterality Date    APPENDECTOMY      COLONOSCOPY N/A 11/17/2016    Procedure: COLONOSCOPY;  Surgeon: Miguel Lance MD;  Location: [facility];  Service: Endoscopy;  Laterality: N/A;     Social History     Tobacco Use    Smoking status: Never Smoker    Smokeless tobacco: Never Used   Substance Use Topics    Alcohol use: Yes     Alcohol/week: 0.0 standard drinks     Comment: socially    Drug use: No       /70 (BP Location: Right arm, Patient Position: Sitting, BP Method: X-Large (Manual))   Pulse 84   Temp 97.7 °F (36.5 °C) (Temporal)   Ht 6' (1.829 m)   Wt (!) 138.5 kg (305 lb 5.4 oz)   BMI 41.41 kg/m²     Review of Systems   Constitutional: Negative for activity change, appetite change, chills, diaphoresis, fatigue, fever and unexpected weight change.   HENT: Negative for hearing loss and tinnitus.    Eyes: Negative for visual disturbance.   Respiratory: Negative for cough, chest tightness, shortness of breath and wheezing.    Cardiovascular: Negative for chest pain, palpitations and leg swelling.   Gastrointestinal: Negative for abdominal distention and abdominal pain.   Genitourinary: Negative for  difficulty urinating.   Musculoskeletal: Negative for arthralgias and back pain.   Neurological: Negative for dizziness, weakness and headaches.       Objective:     Physical Exam  Vitals signs and nursing note reviewed.   Constitutional:       General: He is not in acute distress.     Appearance: He is well-developed. He is not diaphoretic.   HENT:      Right Ear: External ear normal.      Left Ear: External ear normal.      Nose: Nose normal.   Eyes:      Conjunctiva/sclera: Conjunctivae normal.      Pupils: Pupils are equal, round, and reactive to light.   Neck:      Musculoskeletal: Normal range of motion and neck supple.      Thyroid: No thyromegaly.   Cardiovascular:      Rate and Rhythm: Normal rate and regular rhythm.      Heart sounds: Normal heart sounds. No murmur.   Pulmonary:      Effort: Pulmonary effort is normal. No respiratory distress.      Breath sounds: Normal breath sounds. No wheezing.   Abdominal:      General: Bowel sounds are normal. There is no distension.      Palpations: Abdomen is soft.      Tenderness: There is no abdominal tenderness. There is no guarding.   Musculoskeletal:      Right lower leg: No edema.      Left lower leg: No edema.   Skin:     General: Skin is warm and dry.      Findings: No rash.   Neurological:      Mental Status: He is alert and oriented to person, place, and time.      Cranial Nerves: No cranial nerve deficit.   Psychiatric:         Mood and Affect: Mood normal.         Behavior: Behavior normal.         Thought Content: Thought content normal.         Judgment: Judgment normal.         Lab Results   Component Value Date    WBC 7.21 10/28/2019    HGB 16.3 10/28/2019    HCT 50.8 10/28/2019     10/28/2019    CHOL 162 02/04/2020    TRIG 126 02/04/2020    HDL 34 (L) 02/04/2020    ALT 42 10/28/2019    AST 24 10/28/2019     10/28/2019    K 4.4 10/28/2019     10/28/2019    CREATININE 1.2 10/28/2019    BUN 19 10/28/2019    CO2 25 10/28/2019    TSH  3.340 10/28/2019    PSA 2.6 10/28/2019    HGBA1C 8.7 (H) 08/24/2020       Assessment:     1. Uncontrolled type 2 diabetes mellitus with hyperglycemia    2. Dyslipidemia associated with type 2 diabetes mellitus    3. Morbid obesity due to excess calories    4. Prostate cancer screening         Plan:     Uncontrolled type 2 diabetes mellitus with hyperglycemia  -     Hemoglobin A1C; Future; Expected date: 11/25/2020  -     Microalbumin/creatinine urine ratio; Future; Expected date: 11/25/2020    Dyslipidemia associated with type 2 diabetes mellitus  -     CBC auto differential; Future; Expected date: 11/25/2020  -     Comprehensive metabolic panel; Future; Expected date: 11/25/2020  -     Lipid Panel; Future; Expected date: 11/25/2020  -     TSH; Future; Expected date: 11/25/2020    Morbid obesity due to excess calories    Prostate cancer screening  -     PSA, Screening; Future; Expected date: 11/25/2020    Other orders  -     Discontinue: semaglutide (OZEMPIC) 0.25 mg or 0.5 mg(2 mg/1.5 mL) PnIj; Inject 0.5 mg into the skin every 7 days.  Dispense: 1.5 mL; Refill: 6  -     semaglutide (OZEMPIC) 0.25 mg or 0.5 mg(2 mg/1.5 mL) PnIj; Inject 0.5 mg into the skin every 7 days.  Dispense: 4.5 mL; Refill: 3      A1C is uncontrolled  Start Ozempic 0.5mg wkly  BP stable  Work on better diet/exercise/weight loss  Monitor bowels//report if any more problems  F/u with labs in 3 mos

## 2021-03-03 ENCOUNTER — PATIENT OUTREACH (OUTPATIENT)
Dept: ADMINISTRATIVE | Facility: HOSPITAL | Age: 66
End: 2021-03-03

## 2021-03-03 DIAGNOSIS — E11.9 TYPE 2 DIABETES MELLITUS WITHOUT COMPLICATION: ICD-10-CM

## 2021-08-04 ENCOUNTER — PATIENT MESSAGE (OUTPATIENT)
Dept: ADMINISTRATIVE | Facility: HOSPITAL | Age: 66
End: 2021-08-04

## 2021-12-03 ENCOUNTER — PATIENT OUTREACH (OUTPATIENT)
Dept: ADMINISTRATIVE | Facility: HOSPITAL | Age: 66
End: 2021-12-03
Payer: MEDICARE

## 2022-01-28 ENCOUNTER — PATIENT OUTREACH (OUTPATIENT)
Dept: ADMINISTRATIVE | Facility: HOSPITAL | Age: 67
End: 2022-01-28
Payer: MEDICARE

## 2022-01-28 NOTE — PROGRESS NOTES
Working eye exam report; Patient sees outside provider; PCP changed to Dr. Domo Walsh; Patient scheduled for dm eye exam 2/4/2022    ROHIT faxed to Dr. Sung Fontaine 1x to request 2021 dm eye exam. Remind me set 1 week.

## 2022-01-28 NOTE — LETTER
AUTHORIZATION FOR RELEASE OF   CONFIDENTIAL INFORMATION      We are seeing Aniket Brown, date of birth 1955, in the clinic at [facility]. Domo Walsh MD is the patient's PCP. Aniket Brown has an outstanding lab/procedure at the time we reviewed his chart. In order to help keep his health information updated, he has authorized us to request the following medical record(s):        (  )  MAMMOGRAM                                      (  )  COLONOSCOPY      (  )  PAP SMEAR                                          (  )  OUTSIDE LAB RESULTS     (  )  DEXA SCAN                                          ( x ) DM EYE EXAM            (  )  FOOT EXAM                                          (  )  ENTIRE RECORD     (  )  OUTSIDE IMMUNIZATIONS                 (  )  _______________         Please fax records to [facility], Chaillie P Daniel, MD, [phone]   If you have any questions, please contact    [facility]ENES[facility] at [phone]      Patient Name: Aniket Brown  : 1955  Patient Phone #: [phone]

## 2022-01-28 NOTE — LETTER
AUTHORIZATION FOR RELEASE OF   CONFIDENTIAL INFORMATION      We are seeing Aniket Brown, date of birth 1955, in the clinic at [facility]. Domo Walsh MD is the patient's PCP. Aniket Brown has an outstanding lab/procedure at the time we reviewed his chart. In order to help keep his health information updated, he has authorized us to request the following medical record(s):        (  )  MAMMOGRAM                                      (  )  COLONOSCOPY      (  )  PAP SMEAR                                          (  )  OUTSIDE LAB RESULTS     (  )  DEXA SCAN                                          ( x ) DM EYE EXAM [id number]           (  )  FOOT EXAM                                          (  )  ENTIRE RECORD     (  )  OUTSIDE IMMUNIZATIONS                 (  )  _______________         Please fax records to [facility], Chaillie P Daniel, MD, [phone]   If you have any questions, please contact    [facility]ENES[facility] at [phone]        Patient Name: Aniket Brown  : 1955  Patient Phone #: [phone]

## 2022-02-08 NOTE — PROGRESS NOTES
2nd attempt  ROHIT faxed to Dr. Sung Fontaine 1x to request 2021 dm eye exam. Remind me set 1 week.

## 2022-02-09 NOTE — PROGRESS NOTES
Manually uploaded EYE EXAM 2/3/2022 to media    Called Dr. Sung Fontaine's office to request documentation of positive/negative retinopathy. Spoke with assistant who states that they do not have the patient listed as diabetic in their system.

## 2025-03-20 LAB
LEFT EYE DM RETINOPATHY: NEGATIVE
RIGHT EYE DM RETINOPATHY: NEGATIVE

## 2025-04-07 ENCOUNTER — PATIENT OUTREACH (OUTPATIENT)
Dept: ADMINISTRATIVE | Facility: HOSPITAL | Age: 70
End: 2025-04-07